# Patient Record
Sex: FEMALE | Race: WHITE | NOT HISPANIC OR LATINO
[De-identification: names, ages, dates, MRNs, and addresses within clinical notes are randomized per-mention and may not be internally consistent; named-entity substitution may affect disease eponyms.]

---

## 2017-10-29 ENCOUNTER — TRANSCRIPTION ENCOUNTER (OUTPATIENT)
Age: 57
End: 2017-10-29

## 2017-11-08 ENCOUNTER — TRANSCRIPTION ENCOUNTER (OUTPATIENT)
Age: 57
End: 2017-11-08

## 2018-02-06 ENCOUNTER — APPOINTMENT (OUTPATIENT)
Dept: INFECTIOUS DISEASE | Facility: CLINIC | Age: 58
End: 2018-02-06
Payer: SELF-PAY

## 2018-02-06 DIAGNOSIS — Z71.89 OTHER SPECIFIED COUNSELING: ICD-10-CM

## 2018-02-06 PROCEDURE — 90690 TYPHOID VACCINE ORAL: CPT

## 2018-02-06 PROCEDURE — 90632 HEPA VACCINE ADULT IM: CPT

## 2018-02-06 PROCEDURE — 90473 IMMUNE ADMIN ORAL/NASAL: CPT | Mod: NC

## 2018-02-06 PROCEDURE — 90472 IMMUNIZATION ADMIN EACH ADD: CPT | Mod: NC

## 2018-02-06 PROCEDURE — 99401 PREV MED CNSL INDIV APPRX 15: CPT | Mod: 25

## 2018-02-06 RX ORDER — AZITHROMYCIN 250 MG/1
250 TABLET, FILM COATED ORAL
Qty: 4 | Refills: 0 | Status: ACTIVE | COMMUNITY
Start: 2018-02-06 | End: 1900-01-01

## 2018-05-13 ENCOUNTER — TRANSCRIPTION ENCOUNTER (OUTPATIENT)
Age: 58
End: 2018-05-13

## 2019-09-23 ENCOUNTER — APPOINTMENT (OUTPATIENT)
Dept: PULMONOLOGY | Facility: CLINIC | Age: 59
End: 2019-09-23
Payer: COMMERCIAL

## 2019-09-23 VITALS
SYSTOLIC BLOOD PRESSURE: 103 MMHG | RESPIRATION RATE: 16 BRPM | TEMPERATURE: 98.2 F | OXYGEN SATURATION: 99 % | DIASTOLIC BLOOD PRESSURE: 68 MMHG | WEIGHT: 149.5 LBS | HEART RATE: 71 BPM | BODY MASS INDEX: 24.03 KG/M2 | HEIGHT: 66 IN

## 2019-09-23 DIAGNOSIS — F51.04 PSYCHOPHYSIOLOGIC INSOMNIA: ICD-10-CM

## 2019-09-23 PROCEDURE — 99204 OFFICE O/P NEW MOD 45 MIN: CPT

## 2019-09-23 RX ORDER — ZOLPIDEM TARTRATE 5 MG/1
5 TABLET ORAL
Qty: 15 | Refills: 0 | Status: ACTIVE | COMMUNITY
Start: 2019-09-23 | End: 1900-01-01

## 2019-09-23 NOTE — REVIEW OF SYSTEMS
[Snoring] : snoring [Arthralgias] : arthralgias [Difficulty Initiating Sleep] : difficulty falling asleep [Difficulty Maintaining Sleep] : difficulty maintaining sleep [Negative] : Psychiatric [EDS: ESS=____] : no daytime somnolence [Fatigue] : no fatigue [Nasal Congestion] : no nasal congestion [Witnessed Apneas] : no witnessed apnea [Chest Pain] : no chest pain [Obesity] : not obese [Anemia] : no anemia [A.M. Headache] : no headache present upon awakening [Lower Extremity Discomfort] : no lower extremity discomfort [Unusual Sleep Behavior] : no unusual sleep behavior [Cataplexy] :  no cataplexy

## 2019-09-23 NOTE — PHYSICAL EXAM
[General Appearance - In No Acute Distress] : no acute distress [Normal Conjunctiva] : the conjunctiva exhibited no abnormalities [III] : III [Neck Appearance] : the appearance of the neck was normal [] : the neck was supple [Heart Rate And Rhythm] : heart rate was normal and rhythm regular [Heart Sounds] : normal S1 and S2 [Respiration, Rhythm And Depth] : normal respiratory rhythm and effort [Auscultation Breath Sounds / Voice Sounds] : lungs were clear to auscultation bilaterally [Involuntary Movements] : no involuntary movements were seen [Nail Clubbing] : no clubbing of the fingernails [Non-Pitting] : non-pitting [Skin Color & Pigmentation] : normal skin color and pigmentation [No Focal Deficits] : no focal deficits [Oriented To Time, Place, And Person] : oriented to person, place, and time [Affect] : the affect was normal

## 2019-09-23 NOTE — HISTORY OF PRESENT ILLNESS
[FreeTextEntry1] : 58yo F with history of hip replacement in 3/19. Since then, she has had difficulty falling asleep in the beginning. She gets into bed around 10:30pm, reads a bit, she takes 5mg ambien (since the surgery) and she falls asleep within half and hour and she sleeps until the morning, around 7am. She feels rested in the am, sometimes groggy and feels pretty well during the day. Prior to her precipitating factor of the hip surgery, she experienced insomnia intermittently for which she would sometimes take ambien. \par \par She snores at night but denies gasping, choking, witnessed apneas. She denies any frequent nocturnal awakenings and denies excessive daytime somnolence as well. Also denies any nasal congestion.

## 2019-09-23 NOTE — CONSULT LETTER
[Dear  ___] : Dear  [unfilled], [Consult Letter:] : I had the pleasure of evaluating your patient, [unfilled]. [Please see my note below.] : Please see my note below. [Consult Closing:] : Thank you very much for allowing me to participate in the care of this patient.  If you have any questions, please do not hesitate to contact me. [Sincerely,] : Sincerely, [FreeTextEntry3] : Yessica Bates MD

## 2019-09-23 NOTE — ASSESSMENT
[FreeTextEntry1] : 58yo F with history of hip replacement in 3/19. Since then, she has had difficulty falling asleep in the beginning. She gets into bed around 10:30pm, reads a bit, she takes 5mg ambien (since the surgery) and she falls asleep within half and hour and she sleeps until the morning, around 7am. She feels rested in the am, sometimes groggy and feels pretty well during the day. Prior to her precipitating factor of the hip surgery, she experienced insomnia intermittently for which she would sometimes take ambien. \par \par She snores at night but denies gasping, choking, witnessed apneas. She denies any frequent nocturnal awakenings and denies excessive daytime somnolence as well. Also denies any nasal congestion. \par \par Snoring -- isolated snoring without other associated signs or symptoms of apnea. This is still a possibility given that she is postmenopausal and has a crowded airway, but will address this further after dealing with chronic insomnia first. \par Chronic insomnia -- with a clear precipitating factor (hip replacement surgery). She is now on ambien 5mg daily and afraid to discontinue this. I explained that intermittent use of ambien would be alright, and decrease the risk of developing tolerance. I suggested that we decrease the dose to 2.5mg daily for now, maintain good sleep hygiene and stimulus control and she will follow up with me in 1 month. I also recommended that she start a simple 3-5 minute breathing exercise (observing/counting her breaths) and use this every day right before she falls alseep in order to link this to her sleep onset (conditioning). That way, in the future when we try and discontinue the ambien, she will still have this as a tool/ritual to help her fall asleep. \par Also explained sleep restriction protocol as a means of shortening sleep onset if she has difficulty with lower ambien dose

## 2019-10-24 ENCOUNTER — RESULT REVIEW (OUTPATIENT)
Age: 59
End: 2019-10-24

## 2019-10-29 ENCOUNTER — APPOINTMENT (OUTPATIENT)
Dept: PULMONOLOGY | Facility: CLINIC | Age: 59
End: 2019-10-29
Payer: COMMERCIAL

## 2019-10-29 VITALS
HEIGHT: 66 IN | BODY MASS INDEX: 24.11 KG/M2 | WEIGHT: 150 LBS | OXYGEN SATURATION: 96 % | SYSTOLIC BLOOD PRESSURE: 117 MMHG | DIASTOLIC BLOOD PRESSURE: 74 MMHG | TEMPERATURE: 98 F | RESPIRATION RATE: 16 BRPM | HEART RATE: 56 BPM

## 2019-10-29 DIAGNOSIS — R06.83 SNORING: ICD-10-CM

## 2019-10-29 PROCEDURE — 99215 OFFICE O/P EST HI 40 MIN: CPT

## 2019-11-21 ENCOUNTER — APPOINTMENT (OUTPATIENT)
Dept: PULMONOLOGY | Facility: CLINIC | Age: 59
End: 2019-11-21
Payer: SELF-PAY

## 2019-11-21 PROCEDURE — 90791 PSYCH DIAGNOSTIC EVALUATION: CPT

## 2019-12-19 ENCOUNTER — APPOINTMENT (OUTPATIENT)
Dept: PULMONOLOGY | Facility: CLINIC | Age: 59
End: 2019-12-19

## 2020-08-10 ENCOUNTER — TRANSCRIPTION ENCOUNTER (OUTPATIENT)
Age: 60
End: 2020-08-10

## 2020-09-02 ENCOUNTER — TRANSCRIPTION ENCOUNTER (OUTPATIENT)
Age: 60
End: 2020-09-02

## 2020-09-20 ENCOUNTER — TRANSCRIPTION ENCOUNTER (OUTPATIENT)
Age: 60
End: 2020-09-20

## 2020-11-17 ENCOUNTER — TRANSCRIPTION ENCOUNTER (OUTPATIENT)
Age: 60
End: 2020-11-17

## 2020-11-18 ENCOUNTER — EMERGENCY (EMERGENCY)
Facility: HOSPITAL | Age: 60
LOS: 1 days | Discharge: HOME | End: 2020-11-18
Attending: EMERGENCY MEDICINE | Admitting: EMERGENCY MEDICINE
Payer: COMMERCIAL

## 2020-11-18 VITALS
OXYGEN SATURATION: 97 % | RESPIRATION RATE: 15 BRPM | TEMPERATURE: 98 F | DIASTOLIC BLOOD PRESSURE: 90 MMHG | HEART RATE: 98 BPM | SYSTOLIC BLOOD PRESSURE: 141 MMHG

## 2020-11-18 DIAGNOSIS — J36 PERITONSILLAR ABSCESS: ICD-10-CM

## 2020-11-18 LAB
ALBUMIN SERPL ELPH-MCNC: 4.5 G/DL — SIGNIFICANT CHANGE UP (ref 3.3–5)
ALP SERPL-CCNC: 61 U/L — SIGNIFICANT CHANGE UP (ref 40–120)
ALT FLD-CCNC: 13 U/L — SIGNIFICANT CHANGE UP (ref 4–33)
ANION GAP SERPL CALC-SCNC: 14 MMO/L — SIGNIFICANT CHANGE UP (ref 7–14)
AST SERPL-CCNC: 16 U/L — SIGNIFICANT CHANGE UP (ref 4–32)
BASOPHILS # BLD AUTO: 0.06 K/UL — SIGNIFICANT CHANGE UP (ref 0–0.2)
BASOPHILS NFR BLD AUTO: 0.5 % — SIGNIFICANT CHANGE UP (ref 0–2)
BILIRUB SERPL-MCNC: 0.7 MG/DL — SIGNIFICANT CHANGE UP (ref 0.2–1.2)
BUN SERPL-MCNC: 18 MG/DL — SIGNIFICANT CHANGE UP (ref 7–23)
CALCIUM SERPL-MCNC: 9.6 MG/DL — SIGNIFICANT CHANGE UP (ref 8.4–10.5)
CHLORIDE SERPL-SCNC: 102 MMOL/L — SIGNIFICANT CHANGE UP (ref 98–107)
CO2 SERPL-SCNC: 22 MMOL/L — SIGNIFICANT CHANGE UP (ref 22–31)
CREAT SERPL-MCNC: 0.82 MG/DL — SIGNIFICANT CHANGE UP (ref 0.5–1.3)
EOSINOPHIL # BLD AUTO: 0.03 K/UL — SIGNIFICANT CHANGE UP (ref 0–0.5)
EOSINOPHIL NFR BLD AUTO: 0.2 % — SIGNIFICANT CHANGE UP (ref 0–6)
GLUCOSE SERPL-MCNC: 113 MG/DL — HIGH (ref 70–99)
HCT VFR BLD CALC: 44.9 % — SIGNIFICANT CHANGE UP (ref 34.5–45)
HGB BLD-MCNC: 15.2 G/DL — SIGNIFICANT CHANGE UP (ref 11.5–15.5)
IMM GRANULOCYTES NFR BLD AUTO: 0.5 % — SIGNIFICANT CHANGE UP (ref 0–1.5)
LYMPHOCYTES # BLD AUTO: 1.79 K/UL — SIGNIFICANT CHANGE UP (ref 1–3.3)
LYMPHOCYTES # BLD AUTO: 14 % — SIGNIFICANT CHANGE UP (ref 13–44)
MCHC RBC-ENTMCNC: 29.4 PG — SIGNIFICANT CHANGE UP (ref 27–34)
MCHC RBC-ENTMCNC: 33.9 % — SIGNIFICANT CHANGE UP (ref 32–36)
MCV RBC AUTO: 86.8 FL — SIGNIFICANT CHANGE UP (ref 80–100)
MONOCYTES # BLD AUTO: 1.34 K/UL — HIGH (ref 0–0.9)
MONOCYTES NFR BLD AUTO: 10.4 % — SIGNIFICANT CHANGE UP (ref 2–14)
NEUTROPHILS # BLD AUTO: 9.54 K/UL — HIGH (ref 1.8–7.4)
NEUTROPHILS NFR BLD AUTO: 74.4 % — SIGNIFICANT CHANGE UP (ref 43–77)
NRBC # FLD: 0 K/UL — SIGNIFICANT CHANGE UP (ref 0–0)
PLATELET # BLD AUTO: 261 K/UL — SIGNIFICANT CHANGE UP (ref 150–400)
PMV BLD: 10.2 FL — SIGNIFICANT CHANGE UP (ref 7–13)
POTASSIUM SERPL-MCNC: 3.8 MMOL/L — SIGNIFICANT CHANGE UP (ref 3.5–5.3)
POTASSIUM SERPL-SCNC: 3.8 MMOL/L — SIGNIFICANT CHANGE UP (ref 3.5–5.3)
PROT SERPL-MCNC: 7.2 G/DL — SIGNIFICANT CHANGE UP (ref 6–8.3)
RBC # BLD: 5.17 M/UL — SIGNIFICANT CHANGE UP (ref 3.8–5.2)
RBC # FLD: 13 % — SIGNIFICANT CHANGE UP (ref 10.3–14.5)
SARS-COV-2 RNA SPEC QL NAA+PROBE: SIGNIFICANT CHANGE UP
SODIUM SERPL-SCNC: 138 MMOL/L — SIGNIFICANT CHANGE UP (ref 135–145)
WBC # BLD: 12.83 K/UL — HIGH (ref 3.8–10.5)
WBC # FLD AUTO: 12.83 K/UL — HIGH (ref 3.8–10.5)

## 2020-11-18 PROCEDURE — 99245 OFF/OP CONSLTJ NEW/EST HI 55: CPT | Mod: 25

## 2020-11-18 PROCEDURE — 99218: CPT

## 2020-11-18 PROCEDURE — 70491 CT SOFT TISSUE NECK W/DYE: CPT | Mod: 26

## 2020-11-18 PROCEDURE — 31505 DIAGNOSTIC LARYNGOSCOPY: CPT

## 2020-11-18 RX ORDER — SODIUM CHLORIDE 9 MG/ML
1000 INJECTION INTRAMUSCULAR; INTRAVENOUS; SUBCUTANEOUS ONCE
Refills: 0 | Status: COMPLETED | OUTPATIENT
Start: 2020-11-18 | End: 2020-11-18

## 2020-11-18 RX ORDER — METOPROLOL TARTRATE 50 MG
12.5 TABLET ORAL ONCE
Refills: 0 | Status: COMPLETED | OUTPATIENT
Start: 2020-11-18 | End: 2020-11-18

## 2020-11-18 RX ORDER — KETOROLAC TROMETHAMINE 30 MG/ML
30 SYRINGE (ML) INJECTION ONCE
Refills: 0 | Status: DISCONTINUED | OUTPATIENT
Start: 2020-11-18 | End: 2020-11-18

## 2020-11-18 RX ORDER — SODIUM CHLORIDE 9 MG/ML
1000 INJECTION INTRAMUSCULAR; INTRAVENOUS; SUBCUTANEOUS
Refills: 0 | Status: DISCONTINUED | OUTPATIENT
Start: 2020-11-18 | End: 2020-11-22

## 2020-11-18 RX ORDER — DEXAMETHASONE 0.5 MG/5ML
6 ELIXIR ORAL EVERY 12 HOURS
Refills: 0 | Status: DISCONTINUED | OUTPATIENT
Start: 2020-11-19 | End: 2020-11-22

## 2020-11-18 RX ORDER — ATORVASTATIN CALCIUM 80 MG/1
80 TABLET, FILM COATED ORAL AT BEDTIME
Refills: 0 | Status: DISCONTINUED | OUTPATIENT
Start: 2020-11-18 | End: 2020-11-22

## 2020-11-18 RX ORDER — IBUPROFEN 200 MG
600 TABLET ORAL EVERY 6 HOURS
Refills: 0 | Status: DISCONTINUED | OUTPATIENT
Start: 2020-11-18 | End: 2020-11-22

## 2020-11-18 RX ORDER — METOPROLOL TARTRATE 50 MG
12.5 TABLET ORAL ONCE
Refills: 0 | Status: DISCONTINUED | OUTPATIENT
Start: 2020-11-18 | End: 2020-11-18

## 2020-11-18 RX ORDER — METOPROLOL TARTRATE 50 MG
25 TABLET ORAL ONCE
Refills: 0 | Status: COMPLETED | OUTPATIENT
Start: 2020-11-18 | End: 2020-11-18

## 2020-11-18 RX ORDER — ASPIRIN/CALCIUM CARB/MAGNESIUM 324 MG
81 TABLET ORAL DAILY
Refills: 0 | Status: DISCONTINUED | OUTPATIENT
Start: 2020-11-18 | End: 2020-11-22

## 2020-11-18 RX ORDER — ZOLPIDEM TARTRATE 10 MG/1
5 TABLET ORAL ONCE
Refills: 0 | Status: DISCONTINUED | OUTPATIENT
Start: 2020-11-18 | End: 2020-11-18

## 2020-11-18 RX ORDER — DEXAMETHASONE 0.5 MG/5ML
6 ELIXIR ORAL ONCE
Refills: 0 | Status: COMPLETED | OUTPATIENT
Start: 2020-11-18 | End: 2020-11-18

## 2020-11-18 RX ORDER — ACETAMINOPHEN 500 MG
975 TABLET ORAL EVERY 6 HOURS
Refills: 0 | Status: DISCONTINUED | OUTPATIENT
Start: 2020-11-18 | End: 2020-11-22

## 2020-11-18 RX ADMIN — Medication 100 MILLIGRAM(S): at 13:44

## 2020-11-18 RX ADMIN — Medication 30 MILLIGRAM(S): at 13:44

## 2020-11-18 RX ADMIN — ZOLPIDEM TARTRATE 5 MILLIGRAM(S): 10 TABLET ORAL at 23:17

## 2020-11-18 RX ADMIN — Medication 100 MILLIGRAM(S): at 21:38

## 2020-11-18 RX ADMIN — SODIUM CHLORIDE 125 MILLILITER(S): 9 INJECTION INTRAMUSCULAR; INTRAVENOUS; SUBCUTANEOUS at 21:38

## 2020-11-18 RX ADMIN — ATORVASTATIN CALCIUM 80 MILLIGRAM(S): 80 TABLET, FILM COATED ORAL at 21:41

## 2020-11-18 RX ADMIN — Medication 81 MILLIGRAM(S): at 18:40

## 2020-11-18 RX ADMIN — Medication 12.5 MILLIGRAM(S): at 22:09

## 2020-11-18 RX ADMIN — SODIUM CHLORIDE 1000 MILLILITER(S): 9 INJECTION INTRAMUSCULAR; INTRAVENOUS; SUBCUTANEOUS at 14:08

## 2020-11-18 RX ADMIN — Medication 6 MILLIGRAM(S): at 13:44

## 2020-11-18 RX ADMIN — SODIUM CHLORIDE 125 MILLILITER(S): 9 INJECTION INTRAMUSCULAR; INTRAVENOUS; SUBCUTANEOUS at 18:40

## 2020-11-18 NOTE — ED CDU PROVIDER INITIAL DAY NOTE - MEDICAL DECISION MAKING DETAILS
this is a 60 y.o female with a PMhx of HTN, MV repair presented to the ED complaining of having a sore throat along with fever over the past 2 days,   iv clindamycin  iv decadron  reassess with ENT in morning

## 2020-11-18 NOTE — ED PROVIDER NOTE - NS ED ROS FT
Constitutional: +fevers, no chills.  Eyes: no visual changes.  Ears: no ear drainage, no ear pain.  Nose: no nasal congestion.  Mouth/Throat: +sore throat.  Cardiovascular: no chest pain.  Respiratory: no shortness of breath, no wheezing, no cough  Gastrointestinal: no nausea, no vomiting, no diarrhea, no abdominal pain.  MSK: no flank pain, no back pain.  Genitourinary: no dysuria, no hematuria.  Skin: no rashes.  Neuro: no headache

## 2020-11-18 NOTE — ED ADULT TRIAGE NOTE - CHIEF COMPLAINT QUOTE
Pt presents to ED for sore throat, fevers, chills, and pain when swallowing x3days. Pt states she was sent by NP Wil from ENT for a possible parapharyngeal abscess. Denies SOB and difficulty beathing. Able to speak in full complete sentences. Pmhx of mitral valve repair.

## 2020-11-18 NOTE — ED PROVIDER NOTE - PHYSICAL EXAMINATION
Physical Examination: Gen: NAD, non-toxic, conversational  Eyes: PERRLA, EOMI   HENT: Normocephalic, atraumatic. mildly erythematous pharynx, no tonsilar or uvula, edema, L sided anterior lna,  no rhinorrhea, moist mucous membranes.   CV: RRR, no M/R/G  Resp: CTAB, non-labored, speaking without difficulty on room air  Abd: soft, non-tender, non rigid, no guarding or rebound tenderness  Back: No CVAT bilaterally, no midline ttp  Skin: dry, wwp   Neuro: AOx3, speech is fluent and appropriate, HOLT without laterality, stable gait   Psych: Mood okay, affect euthymic Physical Examination: Gen: NAD, non-toxic, conversational  Eyes: PERRLA, EOMI   HENT: Normocephalic, atraumatic. mildly erythematous pharynx, no tonsilar or uvula, edema, L sided anterior cervical mass palpated non fluctuant, no rhinorrhea, moist mucous membranes.   CV: RRR, no M/R/G  Resp: CTAB, non-labored, speaking without difficulty on room air  Abd: soft, non-tender, non rigid, no guarding or rebound tenderness  Back: No CVAT bilaterally, no midline ttp  Skin: dry, wwp   Neuro: AOx3, speech is fluent and appropriate, HOLT without laterality, stable gait   Psych: Mood okay, affect euthymic Physical Examination: Gen: NAD, non-toxic, conversational  Eyes: PERRLA, EOMI   HENT: Normocephalic, atraumatic. mildly erythematous pharynx, no tonsilar or uvula, edema, L sided anterior cervical mass palpated non fluctuant, no rhinorrhea, moist mucous membranes.   CV: RRR, no M/R/G  Resp: CTAB, non-labored, speaking without difficulty on room air  Abd: soft, non-tender, non rigid, no guarding or rebound tenderness  Back: No CVAT bilaterally, no midline ttp  Skin: dry, wwp   Neuro: AOx3, speech is fluent and appropriate, HOLT without laterality, stable gait   Psych: Mood okay, affect euthymic  ATTENDING PHYSICAL EXAM  GEN - NAD; well appearing; A+O x3, speaking with slightly hoarse voice  HEAD - NC/AT; EYES/NOSE - PERRL, EOMI, mucous membranes moist, no discharge; THROAT: no trismus.  Noted normal elevation of palate with slight deviation of uvula to left side with palate elevation.  NECK: Neck supple, non-tender without lymphadenopathy, no masses, no JVD  PULMONARY - CTA b/l, symmetric breath sounds  CARDIAC -s1s2, RRR, no M,R,G  ABDOMEN - +BS, ND, NT, soft, no guarding, no rebound, no masses   BACK - no CVA tenderness, No vertebral or paravertebral tenderness  EXTREMITIES - symmetric pulses, 2+ dp, capillary refill < 2 seconds, no clubbing, no cyanosis, no edema  SKIN - no rash or bruising   NEUROLOGIC - alert, CN 2-12 intact Physical Examination: Gen: NAD, non-toxic, conversational  Eyes: PERRLA, EOMI   HENT: Normocephalic, atraumatic. mildly erythematous pharynx, L tonsilar erythema, uvula wnl , edema to L tonsil, L sided anterior cervical LNA, no rhinorrhea, moist mucous membranes.   CV: RRR, no M/R/G  Resp: CTAB, non-labored, speaking without difficulty on room air  Abd: soft, non-tender, non rigid, no guarding or rebound tenderness  Back: No CVAT bilaterally, no midline ttp  Skin: dry, wwp   Neuro: AOx3, speech is fluent and appropriate, HOLT without laterality, stable gait   Psych: Mood okay, affect euthymic  ATTENDING PHYSICAL EXAM  GEN - NAD; well appearing; A+O x3, speaking with slightly hoarse voice  HEAD - NC/AT; EYES/NOSE - PERRL, EOMI, mucous membranes moist, no discharge; THROAT: no trismus.  Noted normal elevation of palate with slight deviation of uvula to left side with palate elevation.  NECK: Neck supple, non-tender without lymphadenopathy, no masses, no JVD  PULMONARY - CTA b/l, symmetric breath sounds  CARDIAC -s1s2, RRR, no M,R,G  ABDOMEN - +BS, ND, NT, soft, no guarding, no rebound, no masses   BACK - no CVA tenderness, No vertebral or paravertebral tenderness  EXTREMITIES - symmetric pulses, 2+ dp, capillary refill < 2 seconds, no clubbing, no cyanosis, no edema  SKIN - no rash or bruising   NEUROLOGIC - alert, CN 2-12 intact

## 2020-11-18 NOTE — ED PROVIDER NOTE - CARE PLAN
Principal Discharge DX:	Parapharyngeal abscess   Principal Discharge DX:	Tonsillar abscess  Secondary Diagnosis:	Tonsillitis  Secondary Diagnosis:	Supraglottitis

## 2020-11-18 NOTE — ED ADULT NURSE NOTE - CHIEF COMPLAINT QUOTE
Pt presents to ED for sore throat, fevers, chills, and pain when swallowing x3days. Pt states she was sent by NP Wil from ENT for a possible parapharyngeal abscess. Denies SOB and difficulty beathing. Able to speak in full complete sentences. Pmhx of mitral valve repair.
149

## 2020-11-18 NOTE — ED PROVIDER NOTE - CLINICAL SUMMARY MEDICAL DECISION MAKING FREE TEXT BOX
60F hx htn, MV repair, presents with sore throat, dysphagia, hoarseness and fever x 2 days, tmax 100F, found to have abscess around the vocal chords from ENT office today. ENt paged, CT soft tissue neck, basic labs, treat pain with anti inflammatories and abx

## 2020-11-18 NOTE — CONSULT NOTE ADULT - PROBLEM SELECTOR RECOMMENDATION 9
- No surgical intervention at this time  - Continue decadron 6mg Q12 for another 2 dose  - Continue IV Clindamycin 600mg Q8 hours  - Observe in CDU tonight  - Regular diet  - Pain control per CDU  - Please page [phone] for any questions  - CT neck image reviewed with Dr. Winters  - Case discussed with Dr. Winters

## 2020-11-18 NOTE — ED CDU PROVIDER INITIAL DAY NOTE - OBJECTIVE STATEMENT
this is a 60 y.o female with a PMhx of HTN, MV repair presented to the ED complaining of having a sore throat along with fever over the past 2 days, patient had a fever of approx 100F, patient went to the ENT today, whom found a parapharyngeal abscess, this is a 60 y.o female with a PMhx of HTN, MV repair CAD S/p stent 6 years prior presented to the ED complaining of having a sore throat along with fever over the past 2 days, patient had a fever of approx 100F, patient went to the ENT today, whom found a parapharyngeal abscess, and was told to go to the ER, patient was evaluated by ENT and CT of her neck and was recommend to get IV antibiotics and iv decadron in CDU. Pt currently states that she hasn't been able to eat much , denies having any nausea, vomiting, diarrhea, abdominal pain, CP, SOB, [place], headaches, dizziness. Pt does not have any fever at this time.

## 2020-11-18 NOTE — CONSULT NOTE ADULT - ASSESSMENT
61 y/o F with sore throat for 2 days, sent in by outside ENT to r/o parapharyngeal abscess. FOE shows +pooling of secretions, bilateral erythematous arytenoids with left edematous arytenoid. Afebrile. CT neck shows left-sided tonsillitis with an associated lower tonsillar abscess near the pharyngoepiglottic fold with supraglottic inflammation/supraglottitis.

## 2020-11-18 NOTE — CONSULT NOTE ADULT - SUBJECTIVE AND OBJECTIVE BOX
CC: Sore throat for 2 days    HPI: 59 y/o F with a history of HTN and mitral valve repair who sent in by outside ENT to r/o parapharyngeal abscess. Patient reports sore throat for 2 days and developed fever at home, went to urgent care and sent to outpatient ENT where they scoped her and sent her to ED to r/o parapharyngeal abscess. Patient reports mild odynophagia, denies dysphagia, fever, chills, SOB, trismus, limited ROM at neck, and changes in voice.      PAST MEDICAL & SURGICAL HISTORY:  HTN    Past surgical history  Mitral valve repair      Allergies    No Known Allergies    Intolerances      MEDICATIONS  (STANDING):    MEDICATIONS  (PRN):      Social History: nonsmoker    Family history: No pertinent family history in first degree relatives    ROS:   ENT: all negative except as noted in HPI   CV: denies palpitations  Pulm: denies SOB, cough, hemoptysis  GI: denies change in appetite, indigestion, n/v  : denies pertinent urinary symptoms, urgency  Neuro: denies numbness/tingling, loss of sensation  Psych: denies anxiety  MS: denies muscle weakness, instability  Heme: denies easy bruising or bleeding  Endo: denies heat/cold intolerance, excessive sweating  Vascular: denies LE edema    Vital Signs Last 24 Hrs  T(C): 36.6 (18 Nov 2020 12:49), Max: 36.6 (18 Nov 2020 12:49)  T(F): 97.9 (18 Nov 2020 12:49), Max: 97.9 (18 Nov 2020 12:49)  HR: 98 (18 Nov 2020 12:49) (98 - 98)  BP: 141/90 (18 Nov 2020 12:49) (141/90 - 141/90)  BP(mean): --  RR: 15 (18 Nov 2020 12:49) (15 - 15)  SpO2: 97% (18 Nov 2020 12:49) (97% - 97%)                          15.2   12.83 )-----------( 261      ( 18 Nov 2020 13:37 )             44.9    11-18    138  |  102  |  18  ----------------------------<  113<H>  3.8   |  22  |  0.82    Ca    9.6      18 Nov 2020 13:37    TPro  7.2  /  Alb  4.5  /  TBili  0.7  /  DBili  x   /  AST  16  /  ALT  13  /  AlkPhos  61  11-18       PHYSICAL EXAM:  Gen: NAD  Skin: No rashes, bruises, or lesions  Head: Normocephalic, Atraumatic  Face: no edema, erythema, or fluctuance. Parotid glands soft without mass  Eyes: no scleral injection  Nose: Nares bilaterally patent, no discharge  Mouth: No stridor, no drooling, no trismus.  Mucosa moist, tongue/uvula midline, oropharynx clear  Neck: Flat, supple, no lymphadenopathy, trachea midline, no masses  Lymphatic: No lymphadenopathy  Resp: breathing easily, no stridor  CV: no peripheral edema/cyanosis  GI: nondistended   Peripheral vascular: no JVD or edema  Neuro: facial nerve intact, no facial droop    Fiberoptic Indirect laryngoscopy:  (Scope #2 used)  Reason for Laryngoscopy:    Patient was unable to cooperate with mirror.  Nasopharynx, oropharynx, and hypopharynx clear, no bleeding. Tongue base, posterior pharyngeal wall, vallecula, epiglottis, and subglottis appear normal. +pooling of secretions. No erythema, edema, masses or lesions. Airway patent, no foreign body visualized. No glottic/supraglottic edema. Bilateral erythematous arytenoids with left edematous arytenoid. True vocal cords, vestibular folds, ventricles, pyriform sinuses, and aryepiglottic folds appear normal bilaterally. Vocal cords mobile with good contact b/l.      IMAGING/ADDITIONAL STUDIES:   < from: CT Neck Soft Tissue w/ IV Cont (11.18.20 @ 16:44) >  EXAM:  CT NECK SOFT TISSUE IC        PROCEDURE DATE:  Nov 18 2020         INTERPRETATION:  INDICATIONS:  Sore throat. Suspected abscess.    TECHNIQUE:   Axial images were obtained following the intravenous administration of contrast material. Sagittal and coronal reformatted images were obtained. 90 cc Omnipaque 350 were administered. 10 cc were discarded.    COMPARISON EXAMINATION:  None.    FINDINGS:  AERODIGESTIVE TRACT:  There is a peripherally enhancing low-density collection within the inferior aspect of the left tonsillar bed at the junction with the pharyngoepiglottic fold just above the level of the epiglottis/hyoid measuring 1.7 x 0.9 x 1.3 cm. There is mild haziness of adjacent fat planes as well as soft tissue thickening of the epiglottis and left AE fold compatible with associated inflammatory changes or supraglottitis. There is enlargement and increased enhancement of the left palatine tonsil compatible with associated tonsillitis. There is no significant airway compromise.  LYMPH NODES:  Prominent sized, rounded and abnormally enhancing left level II lymph nodes are seen measuring up to 1.4 x 0.9 cm. No other adenopathy is seen.  PAROTID GLANDS:  Normal.  SUBMANDIBULAR GLANDS:  Normal.  THYROID GLAND:  Heterogeneous with scattered small nodules  VISUALIZED PARANASAL SINUSES:  Small left maxillary polyps or retention cysts.  VISUALIZED TYMPANOMASTOID CAVITIES: Clear.  BONES:  Status post median sternotomy  MISCELLANEOUS:  None.    IMPRESSION:  Left-sided tonsillitis with an associated lower tonsillar abscess near the pharyngoepiglottic fold with supraglottic inflammation/supraglottitis.    Prominent sized left level II lymph nodes.    Findings were discussed with Dr. Uribe on 11/18/2020 5:18 PM with read back.                JUAN WARREN MD; Attending Radiologist  This document has been electronically signed. Nov 18 2020  5:19PM    < end of copied text >

## 2020-11-18 NOTE — ED ADULT NURSE NOTE - OBJECTIVE STATEMENT
Pt aa&ox4 hx htn, MV repair, presents with sore throat, dysphagia, hoarseness and fever x 2 days, tmax 100F, found to have "parapharyngeal abscess" from ENT office today. here to see ENT for possible drainage. dysphagia but no SOB. neg strep throat, neg covid from yesterday. Pt breathing unlabored, speaking in full sentences. 20g IV placed in R AC, labs sent. Will monitor.

## 2020-11-18 NOTE — ED PROVIDER NOTE - OBJECTIVE STATEMENT
60F hx htn, MV repair, presents with sore throat, dysphagia, hoarseness and fever x 2 days, tmax 100F, found to have "parapharyngeal abscess" from ENT office today. here to see ENT for drainage. dysphagia but no SOB. neg strep throat, neg covid from yesterday. 60F hx htn, MV repair, presents with sore throat, dysphagia, hoarseness and fever x 2 days, tmax 100F, found to have "parapharyngeal abscess" from ENT office today. here to see ENT for drainage. dysphagia but no SOB. neg strep throat, neg covid from yesterday.  Attending - Agree with above.  I evaluated patient myself. 61 y/o F c/o sore throat and dysphagia x 2 days.  To ENT MD today, seen by NP, and reports had laryngoscopy that showed keaton-laryngeal abscess, and referred to ED for eval and further management.  Denies chest pain, shortness of breath.  + fever yesterday.  No cough.  No covid dx.

## 2020-11-19 VITALS
SYSTOLIC BLOOD PRESSURE: 121 MMHG | OXYGEN SATURATION: 96 % | TEMPERATURE: 98 F | RESPIRATION RATE: 18 BRPM | HEART RATE: 71 BPM | DIASTOLIC BLOOD PRESSURE: 61 MMHG

## 2020-11-19 DIAGNOSIS — J36 PERITONSILLAR ABSCESS: ICD-10-CM

## 2020-11-19 PROCEDURE — 99217: CPT

## 2020-11-19 PROCEDURE — 99243 OFF/OP CNSLTJ NEW/EST LOW 30: CPT

## 2020-11-19 RX ADMIN — Medication 100 MILLIGRAM(S): at 05:46

## 2020-11-19 RX ADMIN — Medication 6 MILLIGRAM(S): at 01:01

## 2020-11-19 RX ADMIN — SODIUM CHLORIDE 125 MILLILITER(S): 9 INJECTION INTRAMUSCULAR; INTRAVENOUS; SUBCUTANEOUS at 06:06

## 2020-11-19 NOTE — ED CDU PROVIDER SUBSEQUENT DAY NOTE - HISTORY
59 y/o F with sore throat for 2 days, dx w/ parapharyngeal abscess. accepted to CDU for IV abx, and steroids. patient reseting comfortably w/ no acute complaints. patient pending ENT reassessment. 61 y/o F with sore throat for 2 days, dx w/tonsillar abscess. accepted to CDU for IV abx, and steroids. patient reseting comfortably w/ no acute complaints. patient pending ENT reassessment.

## 2020-11-19 NOTE — ED CDU PROVIDER SUBSEQUENT DAY NOTE - PROGRESS NOTE DETAILS
Pt notes feeling significantly better.  Pt tolerating PO.  Pt evaluated by ENT PA at this time who recommends discharge on 7 days of Clindamycin with outpatient follow up.  Pt medically stable for discharge.  Strict return precautions given.  Pt to follow up with PMD and ENT.  Reassessment performed and plan for discharge discussed with Dr. Baptiste who agrees with disposition and discharge plan. CDU Att PN: Emile  Pt feeling well.  Seen by ENT again who cleared for DC.  Pt states asides from hoarseness, is much better now. Wants to go home.  I have personally performed a face to face evaluation of this patient including review of the history and focused physical exam.  I have discussed the case and reviewed the plan of care with the PA.

## 2020-11-19 NOTE — PROGRESS NOTE ADULT - SUBJECTIVE AND OBJECTIVE BOX
ENT ISSUE/POD: Left inferior peritonsillar abscess    HPI: Patient seen and examed at bedside, reports the pain and sore throat has improved significantly. No acute event overnight. Denies fever, chills, SOB, and abdominal pain.       PAST MEDICAL & SURGICAL HISTORY:  No pertinent past medical history    No significant past surgical history      Allergies    No Known Allergies    Intolerances      MEDICATIONS  (STANDING):  aspirin  chewable 81 milliGRAM(s) Oral daily  atorvastatin 80 milliGRAM(s) Oral at bedtime  clindamycin IVPB 600 milliGRAM(s) IV Intermittent every 8 hours  dexAMETHasone  Injectable 6 milliGRAM(s) IV Push every 12 hours  sodium chloride 0.9%. 1000 milliLiter(s) (125 mL/Hr) IV Continuous <Continuous>    MEDICATIONS  (PRN):  acetaminophen   Tablet .. 975 milliGRAM(s) Oral every 6 hours PRN Mild Pain (1 - 3)  ibuprofen  Tablet. 600 milliGRAM(s) Oral every 6 hours PRN Moderate Pain (4 - 6)      Social History: see consult note    Family history: see consult note    ROS:   ENT: all negative except as noted in HPI   Pulm: denies SOB, cough, hemoptysis  Neuro: denies numbness/tingling, loss of sensation  Endo: denies heat/cold intolerance, excessive sweating      Vital Signs Last 24 Hrs  T(C): 36.4 (19 Nov 2020 09:23), Max: 36.7 (18 Nov 2020 21:30)  T(F): 97.6 (19 Nov 2020 09:23), Max: 98 (18 Nov 2020 21:30)  HR: 71 (19 Nov 2020 09:23) (71 - 98)  BP: 121/61 (19 Nov 2020 09:23) (109/68 - 141/90)  BP(mean): --  RR: 18 (19 Nov 2020 09:23) (15 - 20)  SpO2: 96% (19 Nov 2020 09:23) (96% - 100%)                          15.2   12.83 )-----------( 261      ( 18 Nov 2020 13:37 )             44.9    11-18    138  |  102  |  18  ----------------------------<  113<H>  3.8   |  22  |  0.82    Ca    9.6      18 Nov 2020 13:37    TPro  7.2  /  Alb  4.5  /  TBili  0.7  /  DBili  x   /  AST  16  /  ALT  13  /  AlkPhos  61  11-18       PHYSICAL EXAM:  Gen: NAD  Skin: No rashes, bruises, or lesions  Head: Normocephalic, Atraumatic  Face: no edema, erythema, or fluctuance. Parotid glands soft without mass  Eyes: no scleral injection  Nose: Nares bilaterally patent, no discharge  Mouth: No Stridor / Drooling / Trismus.  Mucosa moist, tongue/uvula midline, oropharynx clear  Neck: Flat, supple, no lymphadenopathy, trachea midline, no masses  Lymphatic: No lymphadenopathy  Resp: breathing easily, no stridor  Neuro: facial nerve intact, no facial droop

## 2020-11-19 NOTE — ED CDU PROVIDER DISPOSITION NOTE - CLINICAL COURSE
CDU Att DC Note: Emile  Pt presented to ED c throat pain, found ot have tonsillar abscess. Rcv'd Abx, seen by ENT x 2, and feeling much better now.  Cleared this AM by ENT.  Is eating and drinking comfortably, pain controlled, still somewhat hoarse. Interested in DC.  Sara f/u greg ENt  I have personally performed a face to face evaluation of this patient including review of the history and focused physical exam.  I have discussed the case and reviewed the plan of care with the PA.

## 2020-11-19 NOTE — ED CDU PROVIDER DISPOSITION NOTE - PATIENT PORTAL LINK FT
You can access the FollowMyHealth Patient Portal offered by [facility] by registering at the following website: [email or web address]. By joining [employer]’s FollowMyHealth portal, you will also be able to view your health information using other applications (apps) compatible with our system.

## 2020-11-19 NOTE — ED CDU PROVIDER DISPOSITION NOTE - NSFOLLOWUPINSTRUCTIONS_ED_ALL_ED_FT
Advance activity as tolerated.  Continue all previously prescribed medications as directed unless otherwise instructed.  Take Clindamycin 300 mg two pills every 8 hours for 7 days.  Take Tylenol 650mg (Two 325 mg pills) every 4-6 hours as needed for pain or fevers.  Take Motrin (also sold as Advil or Ibuprofen) 400-600 mg (two or three 200 mg over the counter pills) every 8 hours as needed for moderate pain or fevers-- take with food.  Follow up with your primary care physician and (referral list provided or you may call the clinic to make an appointment [phone] )  in 48-72 hours- bring copies of your results.  Return to the ER for worsening or persistent symptoms, including but not limited to worsening/persistent pain, shortness of breath, fevers, difficulty swallowing, difficulty breathing, and/or ANY NEW OR CONCERNING SYMPTOMS. If you have issues obtaining follow up, please call: [phone] to obtain a doctor or specialist who takes your insurance in your area.  You may call [phone] to make an appointment with the internal medicine clinic.

## 2020-11-19 NOTE — ED CDU PROVIDER SUBSEQUENT DAY NOTE - MEDICAL DECISION MAKING DETAILS
59 y/o F with sore throat for 2 days, dx w/ parapharyngeal abscess. accepted to CDU for IV abx, and steroids. patient reseting comfortably w/ no acute complaints. patient pending ENT reassessment. 59 y/o F with sore throat for 2 days, dx w/ tonsillar abscess. accepted to CDU for IV abx, and steroids. patient resting comfortably w/ no acute complaints. patient pending ENT reassessment.

## 2020-11-19 NOTE — PROGRESS NOTE ADULT - ASSESSMENT
61 y/o F presents with sore throat and found to have a left inferior peritonsillar abscess, treated with decadron 10mg Q8 x 24 hours, and IV clindamycin. Tolerating soft diet, pain is controlled. Afebrile, no WBC.

## 2020-11-19 NOTE — PROGRESS NOTE ADULT - PROBLEM SELECTOR PLAN 1
- Patient improved significantly with IV decadron and clindamycin  - Okay to discharge home with PO clindamycin 600mg Q8 hours for 7 days.  - OTC tylenols and NSAIDs for pain control  - Please follow up with ENT clinic or Dr. Wood in a week, please call [phone]  - Case discussed with Dr. Winters.

## 2020-11-19 NOTE — ED CDU PROVIDER SUBSEQUENT DAY NOTE - ATTENDING CONTRIBUTION TO CARE
ED Attending (Dr Baptiste): I have personally performed a face to face bedside history and physical examination of this patient.  I have discussed the case with the PA and  I have personally authored the following components: HPI, ROS, Physical Exam and MDM in addition to reviewing and revising the rest of the Provider Note. 61 y/o F with sore throat for 2 days, dx w/ tonsillar abscess. accepted to CDU for IV abx, and steroids. patient resting comfortably w/ no acute complaints. patient pending ENT reassessment.

## 2020-11-23 LAB
CULTURE RESULTS: SIGNIFICANT CHANGE UP
CULTURE RESULTS: SIGNIFICANT CHANGE UP
SPECIMEN SOURCE: SIGNIFICANT CHANGE UP
SPECIMEN SOURCE: SIGNIFICANT CHANGE UP

## 2020-12-02 ENCOUNTER — TRANSCRIPTION ENCOUNTER (OUTPATIENT)
Age: 60
End: 2020-12-02

## 2021-02-09 PROBLEM — Z78.9 OTHER SPECIFIED HEALTH STATUS: Chronic | Status: ACTIVE | Noted: 2020-11-18

## 2021-02-16 ENCOUNTER — APPOINTMENT (OUTPATIENT)
Dept: CT IMAGING | Facility: IMAGING CENTER | Age: 61
End: 2021-02-16
Payer: COMMERCIAL

## 2021-02-16 ENCOUNTER — OUTPATIENT (OUTPATIENT)
Dept: OUTPATIENT SERVICES | Facility: HOSPITAL | Age: 61
LOS: 1 days | End: 2021-02-16
Payer: COMMERCIAL

## 2021-02-16 ENCOUNTER — RESULT REVIEW (OUTPATIENT)
Age: 61
End: 2021-02-16

## 2021-02-16 DIAGNOSIS — Z00.8 ENCOUNTER FOR OTHER GENERAL EXAMINATION: ICD-10-CM

## 2021-02-16 DIAGNOSIS — K65.1 PERITONEAL ABSCESS: ICD-10-CM

## 2021-02-16 PROCEDURE — 70481 CT ORBIT/EAR/FOSSA W/DYE: CPT | Mod: 26

## 2021-02-16 PROCEDURE — 82565 ASSAY OF CREATININE: CPT

## 2021-02-16 PROCEDURE — 70481 CT ORBIT/EAR/FOSSA W/DYE: CPT

## 2021-03-11 ENCOUNTER — RESULT REVIEW (OUTPATIENT)
Age: 61
End: 2021-03-11

## 2021-03-11 ENCOUNTER — APPOINTMENT (OUTPATIENT)
Dept: ULTRASOUND IMAGING | Facility: HOSPITAL | Age: 61
End: 2021-03-11

## 2021-03-11 ENCOUNTER — OUTPATIENT (OUTPATIENT)
Dept: OUTPATIENT SERVICES | Facility: HOSPITAL | Age: 61
LOS: 1 days | End: 2021-03-11
Payer: COMMERCIAL

## 2021-03-11 PROCEDURE — 88305 TISSUE EXAM BY PATHOLOGIST: CPT

## 2021-03-11 PROCEDURE — 19083 BX BREAST 1ST LESION US IMAG: CPT | Mod: LT

## 2021-03-11 PROCEDURE — A4648: CPT

## 2021-03-11 PROCEDURE — 19083 BX BREAST 1ST LESION US IMAG: CPT

## 2021-03-11 PROCEDURE — 77065 DX MAMMO INCL CAD UNI: CPT

## 2021-03-11 PROCEDURE — 77065 DX MAMMO INCL CAD UNI: CPT | Mod: 26,LT

## 2021-03-11 PROCEDURE — 88305 TISSUE EXAM BY PATHOLOGIST: CPT | Mod: 26

## 2021-11-04 ENCOUNTER — TRANSCRIPTION ENCOUNTER (OUTPATIENT)
Age: 61
End: 2021-11-04

## 2022-03-01 ENCOUNTER — RESULT REVIEW (OUTPATIENT)
Age: 62
End: 2022-03-01

## 2022-04-07 ENCOUNTER — TRANSCRIPTION ENCOUNTER (OUTPATIENT)
Age: 62
End: 2022-04-07

## 2022-05-25 ENCOUNTER — APPOINTMENT (OUTPATIENT)
Dept: CARDIOLOGY | Facility: CLINIC | Age: 62
End: 2022-05-25
Payer: COMMERCIAL

## 2022-05-25 PROCEDURE — 93015 CV STRESS TEST SUPVJ I&R: CPT

## 2022-05-25 PROCEDURE — A9500: CPT

## 2022-05-25 PROCEDURE — 78452 HT MUSCLE IMAGE SPECT MULT: CPT

## 2022-05-25 PROCEDURE — 93306 TTE W/DOPPLER COMPLETE: CPT

## 2022-10-11 ENCOUNTER — OUTPATIENT (OUTPATIENT)
Dept: OUTPATIENT SERVICES | Facility: HOSPITAL | Age: 62
LOS: 1 days | End: 2022-10-11
Payer: COMMERCIAL

## 2022-10-11 ENCOUNTER — APPOINTMENT (OUTPATIENT)
Dept: NUCLEAR MEDICINE | Facility: IMAGING CENTER | Age: 62
End: 2022-10-11

## 2022-10-11 DIAGNOSIS — Z96.642 PRESENCE OF LEFT ARTIFICIAL HIP JOINT: ICD-10-CM

## 2022-10-11 PROCEDURE — 78830 RP LOCLZJ TUM SPECT W/CT 1: CPT

## 2022-10-11 PROCEDURE — 78315 BONE IMAGING 3 PHASE: CPT

## 2022-10-11 PROCEDURE — 78315 BONE IMAGING 3 PHASE: CPT | Mod: 26

## 2022-10-11 PROCEDURE — A9561: CPT

## 2022-10-11 PROCEDURE — 78830 RP LOCLZJ TUM SPECT W/CT 1: CPT | Mod: 26

## 2023-11-07 ENCOUNTER — NON-APPOINTMENT (OUTPATIENT)
Age: 63
End: 2023-11-07

## 2023-11-07 ENCOUNTER — APPOINTMENT (OUTPATIENT)
Dept: CARDIOLOGY | Facility: CLINIC | Age: 63
End: 2023-11-07
Payer: MEDICARE

## 2023-11-07 VITALS
BODY MASS INDEX: 23.89 KG/M2 | DIASTOLIC BLOOD PRESSURE: 78 MMHG | WEIGHT: 148 LBS | HEART RATE: 73 BPM | SYSTOLIC BLOOD PRESSURE: 113 MMHG | OXYGEN SATURATION: 97 %

## 2023-11-07 DIAGNOSIS — Z00.00 ENCOUNTER FOR GENERAL ADULT MEDICAL EXAMINATION W/OUT ABNORMAL FINDINGS: ICD-10-CM

## 2023-11-07 DIAGNOSIS — I25.10 ATHEROSCLEROTIC HEART DISEASE OF NATIVE CORONARY ARTERY W/OUT ANGINA PECTORIS: ICD-10-CM

## 2023-11-07 DIAGNOSIS — Z98.890 OTHER SPECIFIED POSTPROCEDURAL STATES: ICD-10-CM

## 2023-11-07 PROCEDURE — 99205 OFFICE O/P NEW HI 60 MIN: CPT

## 2023-11-07 PROCEDURE — 93000 ELECTROCARDIOGRAM COMPLETE: CPT

## 2023-11-14 ENCOUNTER — APPOINTMENT (OUTPATIENT)
Dept: CARDIOLOGY | Facility: CLINIC | Age: 63
End: 2023-11-14

## 2023-11-19 ENCOUNTER — NON-APPOINTMENT (OUTPATIENT)
Age: 63
End: 2023-11-19

## 2023-11-30 ENCOUNTER — TRANSCRIPTION ENCOUNTER (OUTPATIENT)
Age: 63
End: 2023-11-30

## 2023-12-15 ENCOUNTER — APPOINTMENT (OUTPATIENT)
Dept: CARDIOLOGY | Facility: CLINIC | Age: 63
End: 2023-12-15
Payer: COMMERCIAL

## 2023-12-15 PROCEDURE — 93306 TTE W/DOPPLER COMPLETE: CPT

## 2023-12-29 ENCOUNTER — APPOINTMENT (OUTPATIENT)
Dept: CARDIOLOGY | Facility: CLINIC | Age: 63
End: 2023-12-29

## 2024-01-15 ENCOUNTER — NON-APPOINTMENT (OUTPATIENT)
Age: 64
End: 2024-01-15

## 2024-06-30 ENCOUNTER — NON-APPOINTMENT (OUTPATIENT)
Age: 64
End: 2024-06-30

## 2024-07-02 ENCOUNTER — INPATIENT (INPATIENT)
Facility: HOSPITAL | Age: 64
LOS: 2 days | Discharge: HOME | End: 2024-07-05
Attending: OTOLARYNGOLOGY | Admitting: OTOLARYNGOLOGY
Payer: MEDICARE

## 2024-07-02 ENCOUNTER — TRANSCRIPTION ENCOUNTER (OUTPATIENT)
Age: 64
End: 2024-07-02

## 2024-07-02 VITALS
WEIGHT: 149.91 LBS | TEMPERATURE: 98 F | RESPIRATION RATE: 18 BRPM | SYSTOLIC BLOOD PRESSURE: 109 MMHG | OXYGEN SATURATION: 95 % | HEART RATE: 89 BPM | DIASTOLIC BLOOD PRESSURE: 74 MMHG | HEIGHT: 66 IN

## 2024-07-02 DIAGNOSIS — R50.9 FEVER, UNSPECIFIED: ICD-10-CM

## 2024-07-02 DIAGNOSIS — J38.7 OTHER DISEASES OF LARYNX: ICD-10-CM

## 2024-07-02 LAB
A1C WITH ESTIMATED AVERAGE GLUCOSE RESULT: 5.5 % — SIGNIFICANT CHANGE UP (ref 4–5.6)
ALBUMIN SERPL ELPH-MCNC: 4.1 G/DL — SIGNIFICANT CHANGE UP (ref 3.3–5)
ALP SERPL-CCNC: 93 U/L — SIGNIFICANT CHANGE UP (ref 40–120)
ALT FLD-CCNC: 37 U/L — HIGH (ref 4–33)
ANION GAP SERPL CALC-SCNC: 16 MMOL/L — HIGH (ref 7–14)
AST SERPL-CCNC: 33 U/L — HIGH (ref 4–32)
BASOPHILS # BLD AUTO: 0.04 K/UL — SIGNIFICANT CHANGE UP (ref 0–0.2)
BASOPHILS NFR BLD AUTO: 0.2 % — SIGNIFICANT CHANGE UP (ref 0–2)
BILIRUB SERPL-MCNC: 0.8 MG/DL — SIGNIFICANT CHANGE UP (ref 0.2–1.2)
BUN SERPL-MCNC: 16 MG/DL — SIGNIFICANT CHANGE UP (ref 7–23)
CALCIUM SERPL-MCNC: 8.7 MG/DL — SIGNIFICANT CHANGE UP (ref 8.4–10.5)
CHLORIDE SERPL-SCNC: 103 MMOL/L — SIGNIFICANT CHANGE UP (ref 98–107)
CO2 SERPL-SCNC: 22 MMOL/L — SIGNIFICANT CHANGE UP (ref 22–31)
CREAT SERPL-MCNC: 0.86 MG/DL — SIGNIFICANT CHANGE UP (ref 0.5–1.3)
EGFR: 74 ML/MIN/1.73M2 — SIGNIFICANT CHANGE UP
EOSINOPHIL # BLD AUTO: 0.01 K/UL — SIGNIFICANT CHANGE UP (ref 0–0.5)
EOSINOPHIL NFR BLD AUTO: 0.1 % — SIGNIFICANT CHANGE UP (ref 0–6)
ESTIMATED AVERAGE GLUCOSE: 111 — SIGNIFICANT CHANGE UP
GLUCOSE SERPL-MCNC: 109 MG/DL — HIGH (ref 70–99)
HCT VFR BLD CALC: 40.1 % — SIGNIFICANT CHANGE UP (ref 34.5–45)
HGB BLD-MCNC: 13.7 G/DL — SIGNIFICANT CHANGE UP (ref 11.5–15.5)
IANC: 15.3 K/UL — HIGH (ref 1.8–7.4)
IMM GRANULOCYTES NFR BLD AUTO: 0.7 % — SIGNIFICANT CHANGE UP (ref 0–0.9)
LYMPHOCYTES # BLD AUTO: 0.8 K/UL — LOW (ref 1–3.3)
LYMPHOCYTES # BLD AUTO: 4.8 % — LOW (ref 13–44)
MCHC RBC-ENTMCNC: 29.1 PG — SIGNIFICANT CHANGE UP (ref 27–34)
MCHC RBC-ENTMCNC: 34.2 GM/DL — SIGNIFICANT CHANGE UP (ref 32–36)
MCV RBC AUTO: 85.1 FL — SIGNIFICANT CHANGE UP (ref 80–100)
MONOCYTES # BLD AUTO: 0.47 K/UL — SIGNIFICANT CHANGE UP (ref 0–0.9)
MONOCYTES NFR BLD AUTO: 2.8 % — SIGNIFICANT CHANGE UP (ref 2–14)
NEUTROPHILS # BLD AUTO: 15.3 K/UL — HIGH (ref 1.8–7.4)
NEUTROPHILS NFR BLD AUTO: 91.4 % — HIGH (ref 43–77)
NRBC # BLD: 0 /100 WBCS — SIGNIFICANT CHANGE UP (ref 0–0)
NRBC # FLD: 0 K/UL — SIGNIFICANT CHANGE UP (ref 0–0)
PLATELET # BLD AUTO: 267 K/UL — SIGNIFICANT CHANGE UP (ref 150–400)
POTASSIUM SERPL-MCNC: 3.7 MMOL/L — SIGNIFICANT CHANGE UP (ref 3.5–5.3)
POTASSIUM SERPL-SCNC: 3.7 MMOL/L — SIGNIFICANT CHANGE UP (ref 3.5–5.3)
PROT SERPL-MCNC: 7.5 G/DL — SIGNIFICANT CHANGE UP (ref 6–8.3)
RBC # BLD: 4.71 M/UL — SIGNIFICANT CHANGE UP (ref 3.8–5.2)
RBC # FLD: 13.7 % — SIGNIFICANT CHANGE UP (ref 10.3–14.5)
SODIUM SERPL-SCNC: 141 MMOL/L — SIGNIFICANT CHANGE UP (ref 135–145)
WBC # BLD: 16.74 K/UL — HIGH (ref 3.8–10.5)
WBC # FLD AUTO: 16.74 K/UL — HIGH (ref 3.8–10.5)

## 2024-07-02 PROCEDURE — 70491 CT SOFT TISSUE NECK W/DYE: CPT | Mod: 26,MC

## 2024-07-02 PROCEDURE — 99285 EMERGENCY DEPT VISIT HI MDM: CPT | Mod: GC

## 2024-07-02 RX ORDER — AMPICILLIN AND SULBACTAM 2; 1 G/20ML; G/20ML
3 INJECTION, POWDER, FOR SOLUTION INTRAMUSCULAR; INTRAVENOUS ONCE
Refills: 0 | Status: COMPLETED | OUTPATIENT
Start: 2024-07-03 | End: 2024-07-03

## 2024-07-02 RX ORDER — ACETAMINOPHEN 325 MG
1000 TABLET ORAL ONCE
Refills: 0 | Status: COMPLETED | OUTPATIENT
Start: 2024-07-02 | End: 2024-07-02

## 2024-07-02 RX ORDER — SENNOSIDES 8.6 MG
2 TABLET ORAL AT BEDTIME
Refills: 0 | Status: DISCONTINUED | OUTPATIENT
Start: 2024-07-02 | End: 2024-07-05

## 2024-07-02 RX ORDER — ACETAMINOPHEN 325 MG
650 TABLET ORAL EVERY 6 HOURS
Refills: 0 | Status: DISCONTINUED | OUTPATIENT
Start: 2024-07-02 | End: 2024-07-03

## 2024-07-02 RX ORDER — AMPICILLIN AND SULBACTAM 2; 1 G/20ML; G/20ML
3 INJECTION, POWDER, FOR SOLUTION INTRAMUSCULAR; INTRAVENOUS EVERY 6 HOURS
Refills: 0 | Status: DISCONTINUED | OUTPATIENT
Start: 2024-07-03 | End: 2024-07-05

## 2024-07-02 RX ORDER — POLYETHYLENE GLYCOL 3350 1 G/G
17 POWDER ORAL DAILY
Refills: 0 | Status: DISCONTINUED | OUTPATIENT
Start: 2024-07-02 | End: 2024-07-05

## 2024-07-02 RX ORDER — AMPICILLIN AND SULBACTAM 2; 1 G/20ML; G/20ML
3 INJECTION, POWDER, FOR SOLUTION INTRAMUSCULAR; INTRAVENOUS ONCE
Refills: 0 | Status: COMPLETED | OUTPATIENT
Start: 2024-07-02 | End: 2024-07-02

## 2024-07-02 RX ORDER — AMPICILLIN AND SULBACTAM 2; 1 G/20ML; G/20ML
INJECTION, POWDER, FOR SOLUTION INTRAMUSCULAR; INTRAVENOUS
Refills: 0 | Status: DISCONTINUED | OUTPATIENT
Start: 2024-07-03 | End: 2024-07-05

## 2024-07-02 RX ORDER — DEXAMETHASONE 1 MG/1
10 TABLET ORAL ONCE
Refills: 0 | Status: COMPLETED | OUTPATIENT
Start: 2024-07-02 | End: 2024-07-02

## 2024-07-02 RX ORDER — DEXTROSE MONOHYDRATE AND SODIUM CHLORIDE 5; .3 G/100ML; G/100ML
1000 INJECTION, SOLUTION INTRAVENOUS
Refills: 0 | Status: DISCONTINUED | OUTPATIENT
Start: 2024-07-02 | End: 2024-07-03

## 2024-07-02 RX ORDER — DEXAMETHASONE 1 MG/1
10 TABLET ORAL EVERY 8 HOURS
Refills: 0 | Status: DISCONTINUED | OUTPATIENT
Start: 2024-07-02 | End: 2024-07-05

## 2024-07-02 RX ORDER — MORPHINE SULFATE 100 MG/1
4 TABLET, EXTENDED RELEASE ORAL
Refills: 0 | Status: DISCONTINUED | OUTPATIENT
Start: 2024-07-02 | End: 2024-07-02

## 2024-07-02 RX ADMIN — Medication 1000 MILLIGRAM(S): at 20:22

## 2024-07-02 RX ADMIN — DEXAMETHASONE 10 MILLIGRAM(S): 1 TABLET ORAL at 20:22

## 2024-07-02 RX ADMIN — DEXAMETHASONE 102 MILLIGRAM(S): 1 TABLET ORAL at 19:31

## 2024-07-02 RX ADMIN — AMPICILLIN AND SULBACTAM 200 GRAM(S): 2; 1 INJECTION, POWDER, FOR SOLUTION INTRAMUSCULAR; INTRAVENOUS at 22:36

## 2024-07-02 RX ADMIN — Medication 400 MILLIGRAM(S): at 18:31

## 2024-07-03 ENCOUNTER — RESULT REVIEW (OUTPATIENT)
Age: 64
End: 2024-07-03

## 2024-07-03 DIAGNOSIS — J39.0 RETROPHARYNGEAL AND PARAPHARYNGEAL ABSCESS: ICD-10-CM

## 2024-07-03 DIAGNOSIS — Z01.818 ENCOUNTER FOR OTHER PREPROCEDURAL EXAMINATION: ICD-10-CM

## 2024-07-03 LAB
ALBUMIN SERPL ELPH-MCNC: 3.9 G/DL — SIGNIFICANT CHANGE UP (ref 3.3–5)
ALP SERPL-CCNC: 103 U/L — SIGNIFICANT CHANGE UP (ref 40–120)
ALT FLD-CCNC: 40 U/L — HIGH (ref 4–33)
ANION GAP SERPL CALC-SCNC: 12 MMOL/L — SIGNIFICANT CHANGE UP (ref 7–14)
APTT BLD: 30.3 SEC — SIGNIFICANT CHANGE UP (ref 24.5–35.6)
AST SERPL-CCNC: 32 U/L — SIGNIFICANT CHANGE UP (ref 4–32)
BILIRUB SERPL-MCNC: 0.5 MG/DL — SIGNIFICANT CHANGE UP (ref 0.2–1.2)
BLD GP AB SCN SERPL QL: NEGATIVE — SIGNIFICANT CHANGE UP
BUN SERPL-MCNC: 13 MG/DL — SIGNIFICANT CHANGE UP (ref 7–23)
CALCIUM SERPL-MCNC: 8.2 MG/DL — LOW (ref 8.4–10.5)
CHLORIDE SERPL-SCNC: 104 MMOL/L — SIGNIFICANT CHANGE UP (ref 98–107)
CO2 SERPL-SCNC: 23 MMOL/L — SIGNIFICANT CHANGE UP (ref 22–31)
CREAT SERPL-MCNC: 0.68 MG/DL — SIGNIFICANT CHANGE UP (ref 0.5–1.3)
EGFR: 96 ML/MIN/1.73M2 — SIGNIFICANT CHANGE UP
GLUCOSE SERPL-MCNC: 146 MG/DL — HIGH (ref 70–99)
HCT VFR BLD CALC: 44.1 % — SIGNIFICANT CHANGE UP (ref 34.5–45)
HGB BLD-MCNC: 14.5 G/DL — SIGNIFICANT CHANGE UP (ref 11.5–15.5)
MAGNESIUM SERPL-MCNC: 2.2 MG/DL — SIGNIFICANT CHANGE UP (ref 1.6–2.6)
MCHC RBC-ENTMCNC: 28.9 PG — SIGNIFICANT CHANGE UP (ref 27–34)
MCHC RBC-ENTMCNC: 32.9 GM/DL — SIGNIFICANT CHANGE UP (ref 32–36)
MCV RBC AUTO: 87.8 FL — SIGNIFICANT CHANGE UP (ref 80–100)
NRBC # BLD: 0 /100 WBCS — SIGNIFICANT CHANGE UP (ref 0–0)
NRBC # FLD: 0 K/UL — SIGNIFICANT CHANGE UP (ref 0–0)
PHOSPHATE SERPL-MCNC: 2.2 MG/DL — LOW (ref 2.5–4.5)
PLATELET # BLD AUTO: 317 K/UL — SIGNIFICANT CHANGE UP (ref 150–400)
POTASSIUM SERPL-MCNC: 4 MMOL/L — SIGNIFICANT CHANGE UP (ref 3.5–5.3)
POTASSIUM SERPL-SCNC: 4 MMOL/L — SIGNIFICANT CHANGE UP (ref 3.5–5.3)
PROT SERPL-MCNC: 7.2 G/DL — SIGNIFICANT CHANGE UP (ref 6–8.3)
RBC # BLD: 5.02 M/UL — SIGNIFICANT CHANGE UP (ref 3.8–5.2)
RBC # FLD: 13.5 % — SIGNIFICANT CHANGE UP (ref 10.3–14.5)
RH IG SCN BLD-IMP: POSITIVE — SIGNIFICANT CHANGE UP
SODIUM SERPL-SCNC: 139 MMOL/L — SIGNIFICANT CHANGE UP (ref 135–145)
WBC # BLD: 17.06 K/UL — HIGH (ref 3.8–10.5)
WBC # FLD AUTO: 17.06 K/UL — HIGH (ref 3.8–10.5)

## 2024-07-03 PROCEDURE — 99223 1ST HOSP IP/OBS HIGH 75: CPT | Mod: GC,25,57

## 2024-07-03 PROCEDURE — 76376 3D RENDER W/INTRP POSTPROCES: CPT | Mod: 26

## 2024-07-03 PROCEDURE — 31575 DIAGNOSTIC LARYNGOSCOPY: CPT | Mod: GC

## 2024-07-03 PROCEDURE — 99223 1ST HOSP IP/OBS HIGH 75: CPT

## 2024-07-03 PROCEDURE — 99222 1ST HOSP IP/OBS MODERATE 55: CPT

## 2024-07-03 PROCEDURE — 93306 TTE W/DOPPLER COMPLETE: CPT | Mod: 26

## 2024-07-03 PROCEDURE — 93010 ELECTROCARDIOGRAM REPORT: CPT

## 2024-07-03 PROCEDURE — 42720 I&D ABSC PHRNGL NTRAORL APPR: CPT | Mod: GC

## 2024-07-03 RX ORDER — FENTANYL CITRATE 50 UG/ML
25 INJECTION, SOLUTION INTRAMUSCULAR; INTRAVENOUS
Refills: 0 | Status: DISCONTINUED | OUTPATIENT
Start: 2024-07-03 | End: 2024-07-03

## 2024-07-03 RX ORDER — FLUOXETINE HCL 40 MG
30 CAPSULE ORAL DAILY
Refills: 0 | Status: DISCONTINUED | OUTPATIENT
Start: 2024-07-03 | End: 2024-07-05

## 2024-07-03 RX ORDER — ACETAMINOPHEN 325 MG
325 TABLET ORAL EVERY 4 HOURS
Refills: 0 | Status: DISCONTINUED | OUTPATIENT
Start: 2024-07-03 | End: 2024-07-03

## 2024-07-03 RX ORDER — DEXTROSE MONOHYDRATE AND SODIUM CHLORIDE 5; .3 G/100ML; G/100ML
1000 INJECTION, SOLUTION INTRAVENOUS
Refills: 0 | Status: DISCONTINUED | OUTPATIENT
Start: 2024-07-03 | End: 2024-07-05

## 2024-07-03 RX ORDER — FLUOXETINE HCL 40 MG
30 CAPSULE ORAL ONCE
Refills: 0 | Status: DISCONTINUED | OUTPATIENT
Start: 2024-07-03 | End: 2024-07-03

## 2024-07-03 RX ORDER — ASPIRIN 325 MG/1
81 TABLET, FILM COATED ORAL DAILY
Refills: 0 | Status: DISCONTINUED | OUTPATIENT
Start: 2024-07-03 | End: 2024-07-05

## 2024-07-03 RX ORDER — ENOXAPARIN SODIUM 100 MG/ML
40 INJECTION SUBCUTANEOUS EVERY 24 HOURS
Refills: 0 | Status: DISCONTINUED | OUTPATIENT
Start: 2024-07-04 | End: 2024-07-05

## 2024-07-03 RX ORDER — ACETAMINOPHEN 325 MG
1000 TABLET ORAL ONCE
Refills: 0 | Status: COMPLETED | OUTPATIENT
Start: 2024-07-03 | End: 2024-07-03

## 2024-07-03 RX ORDER — ONDANSETRON HYDROCHLORIDE 2 MG/ML
4 INJECTION INTRAMUSCULAR; INTRAVENOUS ONCE
Refills: 0 | Status: DISCONTINUED | OUTPATIENT
Start: 2024-07-03 | End: 2024-07-03

## 2024-07-03 RX ORDER — MORPHINE SULFATE 100 MG/1
4 TABLET, EXTENDED RELEASE ORAL EVERY 6 HOURS
Refills: 0 | Status: DISCONTINUED | OUTPATIENT
Start: 2024-07-03 | End: 2024-07-05

## 2024-07-03 RX ORDER — METOPROLOL TARTRATE 50 MG
12.5 TABLET ORAL DAILY
Refills: 0 | Status: DISCONTINUED | OUTPATIENT
Start: 2024-07-03 | End: 2024-07-05

## 2024-07-03 RX ORDER — DIPHENHYDRAMINE HCL 12.5MG/5ML
25 ELIXIR ORAL ONCE
Refills: 0 | Status: COMPLETED | OUTPATIENT
Start: 2024-07-03 | End: 2024-07-03

## 2024-07-03 RX ORDER — ATORVASTATIN CALCIUM 20 MG/1
20 TABLET, FILM COATED ORAL AT BEDTIME
Refills: 0 | Status: DISCONTINUED | OUTPATIENT
Start: 2024-07-03 | End: 2024-07-05

## 2024-07-03 RX ORDER — POTASSIUM PHOSPHATE, MONOBASIC POTASSIUM PHOSPHATE, DIBASIC INJECTION, 236; 224 MG/ML; MG/ML
15 SOLUTION, CONCENTRATE INTRAVENOUS ONCE
Refills: 0 | Status: COMPLETED | OUTPATIENT
Start: 2024-07-03 | End: 2024-07-03

## 2024-07-03 RX ORDER — METOPROLOL TARTRATE 50 MG
12.5 TABLET ORAL DAILY
Refills: 0 | Status: DISCONTINUED | OUTPATIENT
Start: 2024-07-03 | End: 2024-07-03

## 2024-07-03 RX ORDER — ACETAMINOPHEN 325 MG
650 TABLET ORAL EVERY 6 HOURS
Refills: 0 | Status: DISCONTINUED | OUTPATIENT
Start: 2024-07-03 | End: 2024-07-03

## 2024-07-03 RX ORDER — MORPHINE SULFATE 100 MG/1
2 TABLET, EXTENDED RELEASE ORAL EVERY 4 HOURS
Refills: 0 | Status: DISCONTINUED | OUTPATIENT
Start: 2024-07-03 | End: 2024-07-05

## 2024-07-03 RX ADMIN — Medication 15 MILLILITER(S): at 23:19

## 2024-07-03 RX ADMIN — AMPICILLIN AND SULBACTAM 200 GRAM(S): 2; 1 INJECTION, POWDER, FOR SOLUTION INTRAMUSCULAR; INTRAVENOUS at 05:11

## 2024-07-03 RX ADMIN — ATORVASTATIN CALCIUM 20 MILLIGRAM(S): 20 TABLET, FILM COATED ORAL at 22:57

## 2024-07-03 RX ADMIN — Medication 12.5 MILLIGRAM(S): at 23:10

## 2024-07-03 RX ADMIN — MORPHINE SULFATE 4 MILLIGRAM(S): 100 TABLET, EXTENDED RELEASE ORAL at 10:28

## 2024-07-03 RX ADMIN — MORPHINE SULFATE 4 MILLIGRAM(S): 100 TABLET, EXTENDED RELEASE ORAL at 15:42

## 2024-07-03 RX ADMIN — Medication 1000 MILLIGRAM(S): at 13:47

## 2024-07-03 RX ADMIN — MORPHINE SULFATE 4 MILLIGRAM(S): 100 TABLET, EXTENDED RELEASE ORAL at 17:39

## 2024-07-03 RX ADMIN — POTASSIUM PHOSPHATE, MONOBASIC POTASSIUM PHOSPHATE, DIBASIC INJECTION, 62.5 MILLIMOLE(S): 236; 224 SOLUTION, CONCENTRATE INTRAVENOUS at 09:40

## 2024-07-03 RX ADMIN — DEXAMETHASONE 102 MILLIGRAM(S): 1 TABLET ORAL at 22:35

## 2024-07-03 RX ADMIN — AMPICILLIN AND SULBACTAM 200 GRAM(S): 2; 1 INJECTION, POWDER, FOR SOLUTION INTRAMUSCULAR; INTRAVENOUS at 12:51

## 2024-07-03 RX ADMIN — Medication 400 MILLIGRAM(S): at 13:27

## 2024-07-03 RX ADMIN — DEXAMETHASONE 102 MILLIGRAM(S): 1 TABLET ORAL at 05:12

## 2024-07-03 RX ADMIN — DEXTROSE MONOHYDRATE AND SODIUM CHLORIDE 95 MILLILITER(S): 5; .3 INJECTION, SOLUTION INTRAVENOUS at 00:21

## 2024-07-03 RX ADMIN — Medication 2 TABLET(S): at 23:11

## 2024-07-03 RX ADMIN — Medication 25 MILLIGRAM(S): at 02:09

## 2024-07-03 RX ADMIN — DEXAMETHASONE 102 MILLIGRAM(S): 1 TABLET ORAL at 15:35

## 2024-07-03 RX ADMIN — Medication 650 MILLIGRAM(S): at 05:49

## 2024-07-03 RX ADMIN — Medication 30 MILLIGRAM(S): at 23:11

## 2024-07-03 RX ADMIN — MORPHINE SULFATE 4 MILLIGRAM(S): 100 TABLET, EXTENDED RELEASE ORAL at 09:40

## 2024-07-04 DIAGNOSIS — J39.0 RETROPHARYNGEAL AND PARAPHARYNGEAL ABSCESS: ICD-10-CM

## 2024-07-04 DIAGNOSIS — I25.10 ATHEROSCLEROTIC HEART DISEASE OF NATIVE CORONARY ARTERY WITHOUT ANGINA PECTORIS: ICD-10-CM

## 2024-07-04 DIAGNOSIS — E83.51 HYPOCALCEMIA: ICD-10-CM

## 2024-07-04 LAB
ANION GAP SERPL CALC-SCNC: 13 MMOL/L — SIGNIFICANT CHANGE UP (ref 7–14)
BUN SERPL-MCNC: 17 MG/DL — SIGNIFICANT CHANGE UP (ref 7–23)
CALCIUM SERPL-MCNC: 7.7 MG/DL — LOW (ref 8.4–10.5)
CHLORIDE SERPL-SCNC: 105 MMOL/L — SIGNIFICANT CHANGE UP (ref 98–107)
CO2 SERPL-SCNC: 21 MMOL/L — LOW (ref 22–31)
CREAT SERPL-MCNC: 0.68 MG/DL — SIGNIFICANT CHANGE UP (ref 0.5–1.3)
EGFR: 96 ML/MIN/1.73M2 — SIGNIFICANT CHANGE UP
GLUCOSE SERPL-MCNC: 133 MG/DL — HIGH (ref 70–99)
GRAM STN FLD: SIGNIFICANT CHANGE UP
HCT VFR BLD CALC: 41.3 % — SIGNIFICANT CHANGE UP (ref 34.5–45)
HGB BLD-MCNC: 13.9 G/DL — SIGNIFICANT CHANGE UP (ref 11.5–15.5)
MAGNESIUM SERPL-MCNC: 2.3 MG/DL — SIGNIFICANT CHANGE UP (ref 1.6–2.6)
MCHC RBC-ENTMCNC: 29.3 PG — SIGNIFICANT CHANGE UP (ref 27–34)
MCHC RBC-ENTMCNC: 33.7 GM/DL — SIGNIFICANT CHANGE UP (ref 32–36)
MCV RBC AUTO: 87.1 FL — SIGNIFICANT CHANGE UP (ref 80–100)
NRBC # BLD: 0 /100 WBCS — SIGNIFICANT CHANGE UP (ref 0–0)
NRBC # FLD: 0 K/UL — SIGNIFICANT CHANGE UP (ref 0–0)
PHOSPHATE SERPL-MCNC: 1.8 MG/DL — LOW (ref 2.5–4.5)
PLATELET # BLD AUTO: 330 K/UL — SIGNIFICANT CHANGE UP (ref 150–400)
POTASSIUM SERPL-MCNC: 4 MMOL/L — SIGNIFICANT CHANGE UP (ref 3.5–5.3)
POTASSIUM SERPL-SCNC: 4 MMOL/L — SIGNIFICANT CHANGE UP (ref 3.5–5.3)
RBC # BLD: 4.74 M/UL — SIGNIFICANT CHANGE UP (ref 3.8–5.2)
RBC # FLD: 13.8 % — SIGNIFICANT CHANGE UP (ref 10.3–14.5)
SODIUM SERPL-SCNC: 139 MMOL/L — SIGNIFICANT CHANGE UP (ref 135–145)
SPECIMEN SOURCE: SIGNIFICANT CHANGE UP
WBC # BLD: 16.27 K/UL — HIGH (ref 3.8–10.5)
WBC # FLD AUTO: 16.27 K/UL — HIGH (ref 3.8–10.5)

## 2024-07-04 PROCEDURE — 99233 SBSQ HOSP IP/OBS HIGH 50: CPT

## 2024-07-04 RX ORDER — SOD PHOS DI, MONO/K PHOS MONO 250 MG
3 TABLET ORAL ONCE
Refills: 0 | Status: DISCONTINUED | OUTPATIENT
Start: 2024-07-04 | End: 2024-07-04

## 2024-07-04 RX ORDER — POTASSIUM PHOSPHATE, MONOBASIC POTASSIUM PHOSPHATE, DIBASIC INJECTION, 236; 224 MG/ML; MG/ML
21 SOLUTION, CONCENTRATE INTRAVENOUS ONCE
Refills: 0 | Status: DISCONTINUED | OUTPATIENT
Start: 2024-07-04 | End: 2024-07-04

## 2024-07-04 RX ORDER — POTASSIUM PHOSPHATE, MONOBASIC POTASSIUM PHOSPHATE, DIBASIC INJECTION, 236; 224 MG/ML; MG/ML
15 SOLUTION, CONCENTRATE INTRAVENOUS ONCE
Refills: 0 | Status: COMPLETED | OUTPATIENT
Start: 2024-07-04 | End: 2024-07-04

## 2024-07-04 RX ORDER — CALCIUM GLUCONATE 98 MG/ML
1 INJECTION, SOLUTION INTRAVENOUS ONCE
Refills: 0 | Status: COMPLETED | OUTPATIENT
Start: 2024-07-04 | End: 2024-07-04

## 2024-07-04 RX ADMIN — AMPICILLIN AND SULBACTAM 200 GRAM(S): 2; 1 INJECTION, POWDER, FOR SOLUTION INTRAMUSCULAR; INTRAVENOUS at 17:07

## 2024-07-04 RX ADMIN — DEXTROSE MONOHYDRATE AND SODIUM CHLORIDE 100 MILLILITER(S): 5; .3 INJECTION, SOLUTION INTRAVENOUS at 09:42

## 2024-07-04 RX ADMIN — Medication 15 MILLILITER(S): at 09:42

## 2024-07-04 RX ADMIN — AMPICILLIN AND SULBACTAM 200 GRAM(S): 2; 1 INJECTION, POWDER, FOR SOLUTION INTRAMUSCULAR; INTRAVENOUS at 00:01

## 2024-07-04 RX ADMIN — AMPICILLIN AND SULBACTAM 200 GRAM(S): 2; 1 INJECTION, POWDER, FOR SOLUTION INTRAMUSCULAR; INTRAVENOUS at 11:17

## 2024-07-04 RX ADMIN — POTASSIUM PHOSPHATE, MONOBASIC POTASSIUM PHOSPHATE, DIBASIC INJECTION, 62.5 MILLIMOLE(S): 236; 224 SOLUTION, CONCENTRATE INTRAVENOUS at 11:17

## 2024-07-04 RX ADMIN — Medication 3 MILLIGRAM(S): at 23:37

## 2024-07-04 RX ADMIN — ASPIRIN 81 MILLIGRAM(S): 325 TABLET, FILM COATED ORAL at 17:08

## 2024-07-04 RX ADMIN — AMPICILLIN AND SULBACTAM 200 GRAM(S): 2; 1 INJECTION, POWDER, FOR SOLUTION INTRAMUSCULAR; INTRAVENOUS at 06:56

## 2024-07-04 RX ADMIN — ENOXAPARIN SODIUM 40 MILLIGRAM(S): 100 INJECTION SUBCUTANEOUS at 13:47

## 2024-07-04 RX ADMIN — DEXAMETHASONE 102 MILLIGRAM(S): 1 TABLET ORAL at 06:00

## 2024-07-04 RX ADMIN — CALCIUM GLUCONATE 100 GRAM(S): 98 INJECTION, SOLUTION INTRAVENOUS at 11:17

## 2024-07-04 RX ADMIN — Medication 30 MILLIGRAM(S): at 17:08

## 2024-07-04 RX ADMIN — DEXAMETHASONE 102 MILLIGRAM(S): 1 TABLET ORAL at 13:12

## 2024-07-04 RX ADMIN — Medication 2 TABLET(S): at 21:17

## 2024-07-04 RX ADMIN — AMPICILLIN AND SULBACTAM 200 GRAM(S): 2; 1 INJECTION, POWDER, FOR SOLUTION INTRAMUSCULAR; INTRAVENOUS at 23:41

## 2024-07-04 RX ADMIN — Medication 15 MILLILITER(S): at 13:12

## 2024-07-04 RX ADMIN — MORPHINE SULFATE 4 MILLIGRAM(S): 100 TABLET, EXTENDED RELEASE ORAL at 11:32

## 2024-07-04 RX ADMIN — Medication 12.5 MILLIGRAM(S): at 17:08

## 2024-07-04 RX ADMIN — DEXAMETHASONE 102 MILLIGRAM(S): 1 TABLET ORAL at 21:18

## 2024-07-04 RX ADMIN — MORPHINE SULFATE 4 MILLIGRAM(S): 100 TABLET, EXTENDED RELEASE ORAL at 11:17

## 2024-07-04 RX ADMIN — Medication 15 MILLILITER(S): at 21:18

## 2024-07-04 RX ADMIN — ATORVASTATIN CALCIUM 20 MILLIGRAM(S): 20 TABLET, FILM COATED ORAL at 21:18

## 2024-07-05 ENCOUNTER — TRANSCRIPTION ENCOUNTER (OUTPATIENT)
Age: 64
End: 2024-07-05

## 2024-07-05 VITALS
DIASTOLIC BLOOD PRESSURE: 69 MMHG | OXYGEN SATURATION: 96 % | HEART RATE: 72 BPM | TEMPERATURE: 99 F | RESPIRATION RATE: 18 BRPM | SYSTOLIC BLOOD PRESSURE: 126 MMHG

## 2024-07-05 LAB
ANION GAP SERPL CALC-SCNC: 14 MMOL/L — SIGNIFICANT CHANGE UP (ref 7–14)
BUN SERPL-MCNC: 15 MG/DL — SIGNIFICANT CHANGE UP (ref 7–23)
CALCIUM SERPL-MCNC: 7.9 MG/DL — LOW (ref 8.4–10.5)
CHLORIDE SERPL-SCNC: 107 MMOL/L — SIGNIFICANT CHANGE UP (ref 98–107)
CO2 SERPL-SCNC: 20 MMOL/L — LOW (ref 22–31)
CREAT SERPL-MCNC: 0.56 MG/DL — SIGNIFICANT CHANGE UP (ref 0.5–1.3)
EGFR: 101 ML/MIN/1.73M2 — SIGNIFICANT CHANGE UP
GLUCOSE SERPL-MCNC: 163 MG/DL — HIGH (ref 70–99)
HCT VFR BLD CALC: 36.8 % — SIGNIFICANT CHANGE UP (ref 34.5–45)
HGB BLD-MCNC: 12.2 G/DL — SIGNIFICANT CHANGE UP (ref 11.5–15.5)
MAGNESIUM SERPL-MCNC: 2.4 MG/DL — SIGNIFICANT CHANGE UP (ref 1.6–2.6)
MCHC RBC-ENTMCNC: 28.6 PG — SIGNIFICANT CHANGE UP (ref 27–34)
MCHC RBC-ENTMCNC: 33.2 GM/DL — SIGNIFICANT CHANGE UP (ref 32–36)
MCV RBC AUTO: 86.4 FL — SIGNIFICANT CHANGE UP (ref 80–100)
NRBC # BLD: 0 /100 WBCS — SIGNIFICANT CHANGE UP (ref 0–0)
NRBC # FLD: 0 K/UL — SIGNIFICANT CHANGE UP (ref 0–0)
PHOSPHATE SERPL-MCNC: 1.8 MG/DL — LOW (ref 2.5–4.5)
PLATELET # BLD AUTO: 321 K/UL — SIGNIFICANT CHANGE UP (ref 150–400)
POTASSIUM SERPL-MCNC: 4.3 MMOL/L — SIGNIFICANT CHANGE UP (ref 3.5–5.3)
POTASSIUM SERPL-SCNC: 4.3 MMOL/L — SIGNIFICANT CHANGE UP (ref 3.5–5.3)
RBC # BLD: 4.26 M/UL — SIGNIFICANT CHANGE UP (ref 3.8–5.2)
RBC # FLD: 13.6 % — SIGNIFICANT CHANGE UP (ref 10.3–14.5)
SODIUM SERPL-SCNC: 141 MMOL/L — SIGNIFICANT CHANGE UP (ref 135–145)
WBC # BLD: 12.65 K/UL — HIGH (ref 3.8–10.5)
WBC # FLD AUTO: 12.65 K/UL — HIGH (ref 3.8–10.5)

## 2024-07-05 RX ORDER — PANTOPRAZOLE SODIUM 40 MG/10ML
1 INJECTION, POWDER, FOR SOLUTION INTRAVENOUS
Qty: 5 | Refills: 0
Start: 2024-07-05 | End: 2024-07-09

## 2024-07-05 RX ORDER — FLUOXETINE HCL 40 MG
3 CAPSULE ORAL
Qty: 0 | Refills: 0 | DISCHARGE
Start: 2024-07-05

## 2024-07-05 RX ORDER — METHYLPREDNISOLONE ACETATE 20 MG/ML
1 VIAL (ML) INJECTION
Qty: 1 | Refills: 0
Start: 2024-07-05 | End: 2024-07-09

## 2024-07-05 RX ORDER — ASPIRIN 325 MG/1
1 TABLET, FILM COATED ORAL
Qty: 0 | Refills: 0 | DISCHARGE
Start: 2024-07-05

## 2024-07-05 RX ORDER — AMOXICILLIN/POTASSIUM CLAV 250-125 MG
1 TABLET ORAL
Qty: 18 | Refills: 0
Start: 2024-07-05 | End: 2024-07-13

## 2024-07-05 RX ORDER — METOPROLOL TARTRATE 50 MG
1 TABLET ORAL
Qty: 0 | Refills: 0 | DISCHARGE
Start: 2024-07-05

## 2024-07-05 RX ORDER — OXYCODONE HYDROCHLORIDE 100 MG/5ML
1 SOLUTION ORAL
Qty: 9 | Refills: 0
Start: 2024-07-05 | End: 2024-07-07

## 2024-07-05 RX ORDER — ATORVASTATIN CALCIUM 20 MG/1
1 TABLET, FILM COATED ORAL
Qty: 0 | Refills: 0 | DISCHARGE
Start: 2024-07-05

## 2024-07-05 RX ADMIN — DEXAMETHASONE 102 MILLIGRAM(S): 1 TABLET ORAL at 05:17

## 2024-07-05 RX ADMIN — AMPICILLIN AND SULBACTAM 200 GRAM(S): 2; 1 INJECTION, POWDER, FOR SOLUTION INTRAMUSCULAR; INTRAVENOUS at 05:18

## 2024-07-05 RX ADMIN — Medication 15 MILLILITER(S): at 07:32

## 2024-07-06 LAB
CULTURE RESULTS: ABNORMAL
CULTURE RESULTS: ABNORMAL
GRAM STN FLD: ABNORMAL
SPECIMEN SOURCE: SIGNIFICANT CHANGE UP

## 2024-07-15 ENCOUNTER — NON-APPOINTMENT (OUTPATIENT)
Age: 64
End: 2024-07-15

## 2024-07-16 ENCOUNTER — APPOINTMENT (OUTPATIENT)
Dept: OTOLARYNGOLOGY | Facility: CLINIC | Age: 64
End: 2024-07-16
Payer: MEDICARE

## 2024-07-16 VITALS
DIASTOLIC BLOOD PRESSURE: 76 MMHG | HEART RATE: 82 BPM | BODY MASS INDEX: 22.82 KG/M2 | OXYGEN SATURATION: 98 % | TEMPERATURE: 97.8 F | HEIGHT: 66 IN | WEIGHT: 142 LBS | SYSTOLIC BLOOD PRESSURE: 110 MMHG

## 2024-07-16 DIAGNOSIS — J39.0 RETROPHARYNGEAL AND PARAPHARYNGEAL ABSCESS: ICD-10-CM

## 2024-07-16 PROCEDURE — 31575 DIAGNOSTIC LARYNGOSCOPY: CPT

## 2024-07-16 PROCEDURE — 99214 OFFICE O/P EST MOD 30 MIN: CPT | Mod: 24,25

## 2024-07-16 PROCEDURE — 76536 US EXAM OF HEAD AND NECK: CPT

## 2024-07-16 RX ORDER — ROSUVASTATIN CALCIUM 5 MG/1
TABLET, FILM COATED ORAL
Refills: 0 | Status: ACTIVE | COMMUNITY

## 2024-07-16 RX ORDER — ALENDRONATE SODIUM 70 MG/75ML
70 SOLUTION, ORAL ORAL
Refills: 0 | Status: ACTIVE | COMMUNITY

## 2024-07-16 RX ORDER — METOPROLOL TARTRATE 75 MG/1
TABLET, FILM COATED ORAL
Refills: 0 | Status: ACTIVE | COMMUNITY

## 2024-07-16 RX ORDER — CLINDAMYCIN HYDROCHLORIDE 300 MG/1
300 CAPSULE ORAL
Qty: 28 | Refills: 0 | Status: ACTIVE | COMMUNITY
Start: 2024-07-16 | End: 1900-01-01

## 2024-07-16 RX ORDER — FLUOXETINE HYDROCHLORIDE 40 MG/1
CAPSULE ORAL
Refills: 0 | Status: ACTIVE | COMMUNITY

## 2024-07-16 RX ORDER — EZETIMIBE 10 MG/1
TABLET ORAL
Refills: 0 | Status: ACTIVE | COMMUNITY

## 2024-07-22 NOTE — PROCEDURE
[Gag Reflex] : gag reflex preventing mirror examination [None] : none [Flexible Endoscope] : examined with the flexible endoscope [Serial Number: ___] : Serial Number: [unfilled] [de-identified] : After patient consents, a FFL is performed.  No lesions in the NPx, OPx, HPx or larynx.  Area of drainage in the left posterior pharyngeal wall without any drainage, slight swelling but no clear collection as noted preoperatively.   VC are mobile, airway patent.

## 2024-07-22 NOTE — PROCEDURE
[Gag Reflex] : gag reflex preventing mirror examination [None] : none [Flexible Endoscope] : examined with the flexible endoscope [Serial Number: ___] : Serial Number: [unfilled] [de-identified] : After patient consents, a FFL is performed.  No lesions in the NPx, OPx, HPx or larynx.  Area of drainage in the left posterior pharyngeal wall without any drainage, slight swelling but no clear collection as noted preoperatively.   VC are mobile, airway patent.

## 2024-07-23 ENCOUNTER — APPOINTMENT (OUTPATIENT)
Dept: OTOLARYNGOLOGY | Facility: CLINIC | Age: 64
End: 2024-07-23
Payer: MEDICARE

## 2024-07-23 VITALS — BODY MASS INDEX: 22.82 KG/M2 | HEART RATE: 80 BPM | WEIGHT: 142 LBS | HEIGHT: 66 IN

## 2024-07-23 DIAGNOSIS — J39.0 RETROPHARYNGEAL AND PARAPHARYNGEAL ABSCESS: ICD-10-CM

## 2024-07-23 PROCEDURE — 99213 OFFICE O/P EST LOW 20 MIN: CPT

## 2024-07-23 NOTE — PHYSICAL EXAM
[Normal] : no rashes [Laryngoscopy Performed] : laryngoscopy was performed, see procedure section for findings [de-identified] : There is fullness along the right anterior neck which is softer, not tender any more.  No LAD. [FreeTextEntry1] : No concerning lesions in the OC/OPx on inspection or palpation.  No purulence noted.

## 2024-07-23 NOTE — DATA REVIEWED
[de-identified] : US today with possible collection vs. phlegmon in right level IV.   [de-identified] :  CT Neck independently interpreted - retropharyngeal abscess on the left.  No collections in the right anterior neck.

## 2024-07-23 NOTE — PHYSICAL EXAM
[Normal] : no rashes [Laryngoscopy Performed] : laryngoscopy was performed, see procedure section for findings [de-identified] : There is fullness along the right anterior neck which is softer, not tender any more.  No LAD. [FreeTextEntry1] : No concerning lesions in the OC/OPx on inspection or palpation.  No purulence noted.

## 2024-07-23 NOTE — DATA REVIEWED
[de-identified] : US today with possible collection vs. phlegmon in right level IV.   [de-identified] :  CT Neck independently interpreted - retropharyngeal abscess on the left.  No collections in the right anterior neck.

## 2024-07-23 NOTE — HISTORY OF PRESENT ILLNESS
[de-identified] : 64y Female with acute onset of L parapharyngeal abscess and was admitted to [facility] and s/p transoral I&D of L parapharyngeal abscess on 7/3/2024. 7/2/2024 Ct of neck Deep neck infection with 1.3 x 2.3 x 3.3 cm left parapharyngeal abscess. Mass effect on the left hypopharyngeal wall and supraglottic airway. Mild epiglottic thickening of retropharyngeal edema, likely reactive. Prominent level 2A cervical lymph nodes. Pt completed antibiotics, Protonix and Medrol dose pack with some relief.  Last week, pt came in and c.o her anterior neck still feels swollen and tender with low grade fever, there was concerned about a possible new collection but attempted aspiration with guidance in this area fails to reveal any evidence of an abscess.  Pt was put on Clindamycin and here to f/u today.  Continues on oral abx. No fevers Reports mild odynophagia, tender neck upon palpation Patient denies dysphagia, aspirations, dyspnea, dysphonia, hemoptysis or otalgia.

## 2024-07-23 NOTE — REASON FOR VISIT
[Subsequent Evaluation] : a subsequent evaluation for [FreeTextEntry2] :  L parapharyngeal abscess f/u

## 2024-07-23 NOTE — HISTORY OF PRESENT ILLNESS
[de-identified] : 64y Female with acute onset of L parapharyngeal abscess and was admitted to [facility] and s/p transoral I&D of L parapharyngeal abscess on 7/3/2024. 7/2/2024 Ct of neck Deep neck infection with 1.3 x 2.3 x 3.3 cm left parapharyngeal abscess. Mass effect on the left hypopharyngeal wall and supraglottic airway. Mild epiglottic thickening of retropharyngeal edema, likely reactive. Prominent level 2A cervical lymph nodes. Pt completed antibiotics, Protonix and Medrol dose pack with some relief.  Last week, pt came in and c.o her anterior neck still feels swollen and tender with low grade fever, there was concerned about a possible new collection but attempted aspiration with guidance in this area fails to reveal any evidence of an abscess.  Pt was put on Clindamycin and here to f/u today.  Continues on oral abx. No fevers Reports mild odynophagia, tender neck upon palpation Patient denies dysphagia, aspirations, dyspnea, dysphonia, hemoptysis or otalgia.

## 2024-08-13 ENCOUNTER — APPOINTMENT (OUTPATIENT)
Dept: OTOLARYNGOLOGY | Facility: CLINIC | Age: 64
End: 2024-08-13
Payer: MEDICARE

## 2024-08-13 VITALS
DIASTOLIC BLOOD PRESSURE: 72 MMHG | WEIGHT: 142 LBS | SYSTOLIC BLOOD PRESSURE: 111 MMHG | BODY MASS INDEX: 22.82 KG/M2 | HEART RATE: 82 BPM | HEIGHT: 66 IN

## 2024-08-13 DIAGNOSIS — J39.0 RETROPHARYNGEAL AND PARAPHARYNGEAL ABSCESS: ICD-10-CM

## 2024-08-13 PROCEDURE — 99213 OFFICE O/P EST LOW 20 MIN: CPT | Mod: 25

## 2024-08-13 PROCEDURE — 31575 DIAGNOSTIC LARYNGOSCOPY: CPT

## 2024-08-13 NOTE — PROCEDURE
[None] : none [Flexible Endoscope] : examined with the flexible endoscope [Serial Number: ___] : Serial Number: [unfilled] [de-identified] : After patient consents, a FFL is performed.  No lesions in the NPx, OPx, HPx or larynx.  VC are mobile, airway patent.  Area of previous drainage is well healed, no fullness along the posterior pharyngeal wall. [de-identified] : RPx abscess

## 2024-08-13 NOTE — PHYSICAL EXAM
[de-identified] : The fullness along the right anterior neck is significantly improved, much softer, no TTP.  No LAD. [FreeTextEntry1] : No concerning lesions in the OC/OPx on inspection or palpation.  No purulence noted.

## 2024-08-13 NOTE — HISTORY OF PRESENT ILLNESS
[de-identified] : 64y Female with acute onset of L parapharyngeal abscess and was admitted to [facility] and s/p transoral I&D of L parapharyngeal abscess on 7/3/2024. 7/2/2024 Ct of neck Deep neck infection with 1.3 x 2.3 x 3.3 cm left parapharyngeal abscess. Mass effect on the left hypopharyngeal wall and supraglottic airway. Mild epiglottic thickening of retropharyngeal edema, likely reactive. Prominent level 2A cervical lymph nodes. Presents today for follow up evaluation. States doing well. Completed abx and steroids as prescribed. Denies throat pain, dyspnea, and dysphagia. No fevers or hemoptysis.

## 2024-11-04 ENCOUNTER — APPOINTMENT (OUTPATIENT)
Dept: PULMONOLOGY | Facility: CLINIC | Age: 64
End: 2024-11-04
Payer: MEDICARE

## 2024-11-04 VITALS
HEART RATE: 73 BPM | OXYGEN SATURATION: 96 % | WEIGHT: 147 LBS | BODY MASS INDEX: 23.63 KG/M2 | HEIGHT: 66 IN | DIASTOLIC BLOOD PRESSURE: 75 MMHG | SYSTOLIC BLOOD PRESSURE: 123 MMHG

## 2024-11-04 DIAGNOSIS — F51.04 PSYCHOPHYSIOLOGIC INSOMNIA: ICD-10-CM

## 2024-11-04 DIAGNOSIS — G47.33 OBSTRUCTIVE SLEEP APNEA (ADULT) (PEDIATRIC): ICD-10-CM

## 2024-11-04 DIAGNOSIS — R06.83 SNORING: ICD-10-CM

## 2024-11-04 PROCEDURE — 99204 OFFICE O/P NEW MOD 45 MIN: CPT

## 2024-11-04 PROCEDURE — G2211 COMPLEX E/M VISIT ADD ON: CPT

## 2024-11-05 PROBLEM — G47.33 OBSTRUCTIVE SLEEP APNEA, ADULT: Status: ACTIVE | Noted: 2024-11-05

## 2024-11-07 ENCOUNTER — EMERGENCY (EMERGENCY)
Facility: HOSPITAL | Age: 64
LOS: 1 days | Discharge: HOME | End: 2024-11-07
Attending: EMERGENCY MEDICINE | Admitting: STUDENT IN AN ORGANIZED HEALTH CARE EDUCATION/TRAINING PROGRAM
Payer: MEDICARE

## 2024-11-07 VITALS
DIASTOLIC BLOOD PRESSURE: 70 MMHG | HEART RATE: 84 BPM | OXYGEN SATURATION: 98 % | RESPIRATION RATE: 18 BRPM | SYSTOLIC BLOOD PRESSURE: 117 MMHG | TEMPERATURE: 99 F

## 2024-11-07 LAB
ALBUMIN SERPL ELPH-MCNC: 4 G/DL — SIGNIFICANT CHANGE UP (ref 3.3–5)
ALP SERPL-CCNC: 76 U/L — SIGNIFICANT CHANGE UP (ref 40–120)
ALT FLD-CCNC: 17 U/L — SIGNIFICANT CHANGE UP (ref 4–33)
ANION GAP SERPL CALC-SCNC: 18 MMOL/L — HIGH (ref 7–14)
AST SERPL-CCNC: 17 U/L — SIGNIFICANT CHANGE UP (ref 4–32)
BASOPHILS # BLD AUTO: 0.03 K/UL — SIGNIFICANT CHANGE UP (ref 0–0.2)
BASOPHILS NFR BLD AUTO: 0.2 % — SIGNIFICANT CHANGE UP (ref 0–2)
BILIRUB SERPL-MCNC: 0.9 MG/DL — SIGNIFICANT CHANGE UP (ref 0.2–1.2)
BUN SERPL-MCNC: 15 MG/DL — SIGNIFICANT CHANGE UP (ref 7–23)
CALCIUM SERPL-MCNC: 8.6 MG/DL — SIGNIFICANT CHANGE UP (ref 8.4–10.5)
CHLORIDE SERPL-SCNC: 103 MMOL/L — SIGNIFICANT CHANGE UP (ref 98–107)
CO2 SERPL-SCNC: 19 MMOL/L — LOW (ref 22–31)
CREAT SERPL-MCNC: 0.79 MG/DL — SIGNIFICANT CHANGE UP (ref 0.5–1.3)
EGFR: 82 ML/MIN/1.73M2 — SIGNIFICANT CHANGE UP
EOSINOPHIL # BLD AUTO: 0 K/UL — SIGNIFICANT CHANGE UP (ref 0–0.5)
EOSINOPHIL NFR BLD AUTO: 0 % — SIGNIFICANT CHANGE UP (ref 0–6)
GLUCOSE SERPL-MCNC: 141 MG/DL — HIGH (ref 70–99)
HCT VFR BLD CALC: 41.7 % — SIGNIFICANT CHANGE UP (ref 34.5–45)
HGB BLD-MCNC: 14.5 G/DL — SIGNIFICANT CHANGE UP (ref 11.5–15.5)
IANC: 11.91 K/UL — HIGH (ref 1.8–7.4)
IMM GRANULOCYTES NFR BLD AUTO: 0.7 % — SIGNIFICANT CHANGE UP (ref 0–0.9)
LYMPHOCYTES # BLD AUTO: 0.55 K/UL — LOW (ref 1–3.3)
LYMPHOCYTES # BLD AUTO: 4.3 % — LOW (ref 13–44)
MCHC RBC-ENTMCNC: 29.8 PG — SIGNIFICANT CHANGE UP (ref 27–34)
MCHC RBC-ENTMCNC: 34.8 G/DL — SIGNIFICANT CHANGE UP (ref 32–36)
MCV RBC AUTO: 85.8 FL — SIGNIFICANT CHANGE UP (ref 80–100)
MONOCYTES # BLD AUTO: 0.28 K/UL — SIGNIFICANT CHANGE UP (ref 0–0.9)
MONOCYTES NFR BLD AUTO: 2.2 % — SIGNIFICANT CHANGE UP (ref 2–14)
NEUTROPHILS # BLD AUTO: 11.91 K/UL — HIGH (ref 1.8–7.4)
NEUTROPHILS NFR BLD AUTO: 92.6 % — HIGH (ref 43–77)
NRBC # BLD: 0 /100 WBCS — SIGNIFICANT CHANGE UP (ref 0–0)
NRBC # FLD: 0 K/UL — SIGNIFICANT CHANGE UP (ref 0–0)
PLATELET # BLD AUTO: 228 K/UL — SIGNIFICANT CHANGE UP (ref 150–400)
POTASSIUM SERPL-MCNC: 4.1 MMOL/L — SIGNIFICANT CHANGE UP (ref 3.5–5.3)
POTASSIUM SERPL-SCNC: 4.1 MMOL/L — SIGNIFICANT CHANGE UP (ref 3.5–5.3)
PROT SERPL-MCNC: 6.7 G/DL — SIGNIFICANT CHANGE UP (ref 6–8.3)
RBC # BLD: 4.86 M/UL — SIGNIFICANT CHANGE UP (ref 3.8–5.2)
RBC # FLD: 13 % — SIGNIFICANT CHANGE UP (ref 10.3–14.5)
SODIUM SERPL-SCNC: 140 MMOL/L — SIGNIFICANT CHANGE UP (ref 135–145)
WBC # BLD: 12.86 K/UL — HIGH (ref 3.8–10.5)
WBC # FLD AUTO: 12.86 K/UL — HIGH (ref 3.8–10.5)

## 2024-11-07 PROCEDURE — 70491 CT SOFT TISSUE NECK W/DYE: CPT | Mod: 26,MC

## 2024-11-07 PROCEDURE — 99223 1ST HOSP IP/OBS HIGH 75: CPT

## 2024-11-07 RX ORDER — METOCLOPRAMIDE HCL 10 MG
10 TABLET ORAL ONCE
Refills: 0 | Status: COMPLETED | OUTPATIENT
Start: 2024-11-07 | End: 2024-11-07

## 2024-11-07 RX ORDER — ONDANSETRON HYDROCHLORIDE 2 MG/ML
4 INJECTION, SOLUTION INTRAMUSCULAR; INTRAVENOUS ONCE
Refills: 0 | Status: COMPLETED | OUTPATIENT
Start: 2024-11-07 | End: 2024-11-07

## 2024-11-07 RX ORDER — AMPICILLIN AND SULBACTAM 2; 1 G/1; G/1
3 INJECTION, POWDER, FOR SOLUTION INTRAMUSCULAR; INTRAVENOUS ONCE
Refills: 0 | Status: COMPLETED | OUTPATIENT
Start: 2024-11-07 | End: 2024-11-07

## 2024-11-07 RX ORDER — SODIUM CHLORIDE 9 MG/ML
1000 INJECTION, SOLUTION INTRAMUSCULAR; INTRAVENOUS; SUBCUTANEOUS ONCE
Refills: 0 | Status: COMPLETED | OUTPATIENT
Start: 2024-11-07 | End: 2024-11-07

## 2024-11-07 RX ORDER — KETOROLAC TROMETHAMINE 30 MG/ML
15 INJECTION INTRAMUSCULAR; INTRAVENOUS ONCE
Refills: 0 | Status: DISCONTINUED | OUTPATIENT
Start: 2024-11-07 | End: 2024-11-07

## 2024-11-07 RX ORDER — DEXAMETHASONE 1.5 MG 1.5 MG/1
10 TABLET ORAL ONCE
Refills: 0 | Status: COMPLETED | OUTPATIENT
Start: 2024-11-07 | End: 2024-11-07

## 2024-11-07 RX ORDER — ACETAMINOPHEN 500 MG
1000 TABLET ORAL ONCE
Refills: 0 | Status: COMPLETED | OUTPATIENT
Start: 2024-11-07 | End: 2024-11-07

## 2024-11-07 RX ADMIN — ONDANSETRON HYDROCHLORIDE 4 MILLIGRAM(S): 2 INJECTION, SOLUTION INTRAMUSCULAR; INTRAVENOUS at 18:07

## 2024-11-07 RX ADMIN — DEXAMETHASONE 1.5 MG 102 MILLIGRAM(S): 1.5 TABLET ORAL at 18:08

## 2024-11-07 RX ADMIN — KETOROLAC TROMETHAMINE 15 MILLIGRAM(S): 30 INJECTION INTRAMUSCULAR; INTRAVENOUS at 18:07

## 2024-11-07 RX ADMIN — Medication 10 MILLIGRAM(S): at 18:07

## 2024-11-07 RX ADMIN — Medication 1000 MILLIGRAM(S): at 18:36

## 2024-11-07 RX ADMIN — AMPICILLIN AND SULBACTAM 200 GRAM(S): 2; 1 INJECTION, POWDER, FOR SOLUTION INTRAMUSCULAR; INTRAVENOUS at 18:36

## 2024-11-07 RX ADMIN — SODIUM CHLORIDE 1000 MILLILITER(S): 9 INJECTION, SOLUTION INTRAMUSCULAR; INTRAVENOUS; SUBCUTANEOUS at 19:17

## 2024-11-07 RX ADMIN — Medication 40 MILLIGRAM(S): at 18:08

## 2024-11-07 NOTE — ED PROVIDER NOTE - PROGRESS NOTE DETAILS
Silver: Mildly-elevated serum WBC. CT w/ 1.4 cm parapharyngeal abscess. Per ENT: treat medically. Observe for improvement. If worsen, then OR.

## 2024-11-07 NOTE — ED PROVIDER NOTE - PHYSICAL EXAMINATION
rash
Well-appearing, well nourished, awake, alert, oriented to person, place, time/situation and in mild pain/vomit distress.    Airway patent. Neck supple. L tonsil and paratonsillar area red/swollen. Uvula midline.    Eyes without scleral injection. No jaundice.    Strong pulse.    Respirations unlabored.    Abdomen soft, non-tender, no guarding.    MSK: Spine appears normal, range of motion is not limited, no muscle or joint tenderness.    Alert and oriented, no gross motor or sensory deficits.    Skin: normal color for race, warm, dry and intact. No evidence of rash.    No SI/HI.

## 2024-11-07 NOTE — CONSULT NOTE ADULT - SUBJECTIVE AND OBJECTIVE BOX
HPI: 64y Female w PMH DMII, HLD, endometrial cancer now in remission with prior PTA in 2020 (tx with IV abx) and recent L parapharyngeal abscess s/p I/D 7/2024 now presenting with sore throat since yesterday with feeling that her abscess has returned, sent in from ENT office Dr. Wells concerned for parapharyngeal abscess again. Reports developing fevers since yesterday and vomiting. Received unasyn x1 in ED.     On chart review, in 2020, pt had lower tonsillar abscess near pharyngoepiglottic fold with supraglottic inflammation, seen by Dr. Winters, resolved with IV abx and decadron. She re-presented 7/2/24 w sore throat x2d, CT showed 1.3 x 2.3 x 3.3 cm left parapharyngeal abscess, underwent I&D by Dr Seymour the next day with improvement. She was seen in ENT clinic with Dr. Seymour 8/13/24 and noted to be recovering well.     Afebrile in ED, WBC 12.9     CT neck:   1.4 x 1.0 x 1.3 cm L parapharyngeal abscess    Allergies    No Known Allergies    Intolerances        PAST MEDICAL & SURGICAL HISTORY:  No pertinent past medical history      No significant past surgical history          SOCIAL HISTORY:  Tobacco History:  ETOH Use:   Drug Use:     FAMILY HISTORY:      REVIEW OF SYSTEMS    General:	  As per HPI      MEDICATIONS:        Vital Signs Last 24 Hrs  T(C): 38 (07 Nov 2024 18:32), Max: 38 (07 Nov 2024 18:32)  T(F): 100.4 (07 Nov 2024 18:32), Max: 100.4 (07 Nov 2024 18:32)  HR: 89 (07 Nov 2024 18:32) (84 - 89)  BP: 108/68 (07 Nov 2024 18:32) (108/68 - 117/70)  BP(mean): --  RR: 18 (07 Nov 2024 18:32) (18 - 18)  SpO2: 97% (07 Nov 2024 18:32) (97% - 98%)    Parameters below as of 07 Nov 2024 18:32  Patient On (Oxygen Delivery Method): room air        LABS:  CBC-    11-07    140  |  103  |  15  ----------------------------<  141[H]  4.1   |  19[L]  |  0.79    Ca    8.6      07 Nov 2024 18:23    TPro  6.7  /  Alb  4.0  /  TBili  0.9  /  DBili  x   /  AST  17  /  ALT  17  /  AlkPhos  76  11-07    Coagulation Studies-    Endocrine Panel-  --  --  8.6 mg/dL        PHYSICAL EXAM:    ENT EXAM-   Constitutional: Well-developed, well-nourished.   Voice: No hoarseness.     Head:  normocephalic, atraumatic.   Ears:  External ears normal  Nose:  Septum intact.  Inferior turbinates normal bilateral  OC/OP: Tongue midline, tonsils 1+. Floor of mouth, buccal mucosa, lips, hard palate, soft palate, uvula, posterior pharyngeal wall normal.  Mucosa moist.  Neck:  Trachea midline.  Thyroid, parotid and submandibular glands normal. L cervical LAD    LARYNGOSCOPY EXAM:     -Verbal consent was obtained from patient prior to procedure.    Indication:    Anesthesia: Afrin spray was applied to the nasal cavities.    Flexible laryngoscopy was performed and revealed the following:    -- Nasopharynx had no mass or exudate.    -- Base of tongue was symmetric and not enlarged.    -- Left pharyngeal wall edema     -- Vallecula was clear    -- Epiglottis, both aryepiglottic folds and both false vocal folds were normal    -- Arytenoids both without edema and erythema     -- True vocal folds were fully mobile and without lesions.     -- Post cricoid area was clear.    -- Interarytenoid edema was absent    The patient tolerated the procedure well.      RADIOLOGY & ADDITIONAL STUDIES:      Assessment/Plan:    64y Female w PMH DMII, HLD, endometrial cancer now in remission with prior PTA in 2020 (tx with IV abx) and recent L parapharyngeal abscess s/p I/D 7/2024 now presenting with sore throat since yesterday, CT neck with 1.4 x 1.0 x 1.3 cm L parapharyngeal abscess. Scope exam with mild L pharyngeal wall edema, airway widely patent, physical exam wnl.     - c/w IV abx  - dex 10q8 x3   - NPO @MN   - ENT to re-eval in AM for improvement

## 2024-11-07 NOTE — ED PROVIDER NOTE - PRINCIPAL DIAGNOSIS
Pharmacy faxed in a request for prior authorization on:    Medication: Omeprazole  Dosage: 40 MG DR Capsules  Quantity requested:  n/a  Pharmacy for prescription has been selected.    Prior authorization request has been initiated and sent for completion.     Tonsillitis Abscess, parapharyngeal

## 2024-11-07 NOTE — ED ADULT NURSE NOTE - NSFALLUNIVINTERV_ED_ALL_ED
Bed/Stretcher in lowest position, wheels locked, appropriate side rails in place/Call bell, personal items and telephone in reach/Instruct patient to call for assistance before getting out of bed/chair/stretcher/Non-slip footwear applied when patient is off stretcher/[place] to call system/Physically safe environment - no spills, clutter or unnecessary equipment/Purposeful proactive rounding/Room/bathroom lighting operational, light cord in reach

## 2024-11-07 NOTE — ED PROVIDER NOTE - OBJECTIVE STATEMENT
Silver: H/o PTA x 2 (once treated only w/ ABx and steroids, once treated w/ ENT I & D). P/w 1-2 days sore throat, F last night, chills. Sent from [facility] Dr. Talley for ABx, steroids, CT, and ENT. Chills today. N/V. Silver: H/o parapharyngeal abscess x 2 (once treated only w/ ABx and steroids, once treated w/ ENT I & D). P/w 1-2 days sore throat, F last night, chills. Sent from [facility] Dr. Talley for ABx, steroids, CT, and ENT. Chills today. N/V.

## 2024-11-07 NOTE — ED PROVIDER NOTE - CROS ED CONS ALL NEG
Add 71510 Cpt? (Important Note: In 2017 The Use Of 70910 Is Being Tracked By Cms To Determine Future Global Period Reimbursement For Global Periods): no
Detail Level: Detailed
- - -

## 2024-11-07 NOTE — ED ADULT NURSE NOTE - OBJECTIVE STATEMENT
Pt with co pain  to left side of neck was sent in by her ENT to ro recurrent abscess, pt with fever and nausea, medicated for her symptoms. pt  with no co sob  no active vomiting fluids infusing awaiting ct scan. Pts  at bedside.

## 2024-11-07 NOTE — ED ADULT TRIAGE NOTE - CHIEF COMPLAINT QUOTE
Pt c/o sore throat, fevers x 2 days. Pt has Hx of throat abscess that required surgical drainage. Denies difficulty breathing. Tolerating secretions. ENT Dr. Seymour

## 2024-11-08 DIAGNOSIS — J39.0 RETROPHARYNGEAL AND PARAPHARYNGEAL ABSCESS: Chronic | ICD-10-CM

## 2024-11-08 LAB — ADD ON TEST-SPECIMEN IN LAB: SIGNIFICANT CHANGE UP

## 2024-11-08 PROCEDURE — 99232 SBSQ HOSP IP/OBS MODERATE 35: CPT

## 2024-11-08 RX ORDER — ASPIRIN/MAG CARB/ALUMINUM AMIN 325 MG
81 TABLET ORAL AT BEDTIME
Refills: 0 | Status: DISCONTINUED | OUTPATIENT
Start: 2024-11-08 | End: 2024-11-11

## 2024-11-08 RX ORDER — AMPICILLIN AND SULBACTAM 2; 1 G/1; G/1
3 INJECTION, POWDER, FOR SOLUTION INTRAMUSCULAR; INTRAVENOUS EVERY 6 HOURS
Refills: 0 | Status: DISCONTINUED | OUTPATIENT
Start: 2024-11-08 | End: 2024-11-11

## 2024-11-08 RX ORDER — METOPROLOL TARTRATE 50 MG
12.5 TABLET ORAL AT BEDTIME
Refills: 0 | Status: DISCONTINUED | OUTPATIENT
Start: 2024-11-08 | End: 2024-11-11

## 2024-11-08 RX ORDER — EZETIMIBE 10 MG/1
10 TABLET ORAL AT BEDTIME
Refills: 0 | Status: DISCONTINUED | OUTPATIENT
Start: 2024-11-08 | End: 2024-11-11

## 2024-11-08 RX ORDER — ZOLPIDEM TARTRATE 10 MG
5 TABLET ORAL AT BEDTIME
Refills: 0 | Status: COMPLETED | OUTPATIENT
Start: 2024-11-08 | End: 2024-11-15

## 2024-11-08 RX ORDER — ACETAMINOPHEN 500 MG
650 TABLET ORAL EVERY 6 HOURS
Refills: 0 | Status: DISCONTINUED | OUTPATIENT
Start: 2024-11-08 | End: 2024-11-08

## 2024-11-08 RX ORDER — AMPICILLIN AND SULBACTAM 2; 1 G/1; G/1
3 INJECTION, POWDER, FOR SOLUTION INTRAMUSCULAR; INTRAVENOUS ONCE
Refills: 0 | Status: COMPLETED | OUTPATIENT
Start: 2024-11-08 | End: 2024-11-08

## 2024-11-08 RX ORDER — ACETAMINOPHEN 500 MG
650 TABLET ORAL ONCE
Refills: 0 | Status: COMPLETED | OUTPATIENT
Start: 2024-11-08 | End: 2024-11-08

## 2024-11-08 RX ORDER — ACETAMINOPHEN 500 MG
1000 TABLET ORAL ONCE
Refills: 0 | Status: COMPLETED | OUTPATIENT
Start: 2024-11-08 | End: 2024-11-08

## 2024-11-08 RX ORDER — KETOROLAC TROMETHAMINE 30 MG/ML
15 INJECTION INTRAMUSCULAR; INTRAVENOUS EVERY 6 HOURS
Refills: 0 | Status: DISCONTINUED | OUTPATIENT
Start: 2024-11-08 | End: 2024-11-08

## 2024-11-08 RX ORDER — SODIUM CHLORIDE 9 MG/ML
1000 INJECTION, SOLUTION INTRAMUSCULAR; INTRAVENOUS; SUBCUTANEOUS
Refills: 0 | Status: DISCONTINUED | OUTPATIENT
Start: 2024-11-08 | End: 2024-11-08

## 2024-11-08 RX ORDER — ONDANSETRON HYDROCHLORIDE 2 MG/ML
4 INJECTION, SOLUTION INTRAMUSCULAR; INTRAVENOUS EVERY 6 HOURS
Refills: 0 | Status: DISCONTINUED | OUTPATIENT
Start: 2024-11-08 | End: 2024-11-11

## 2024-11-08 RX ORDER — DEXAMETHASONE 1.5 MG 1.5 MG/1
10 TABLET ORAL EVERY 8 HOURS
Refills: 0 | Status: COMPLETED | OUTPATIENT
Start: 2024-11-08 | End: 2024-11-08

## 2024-11-08 RX ORDER — AMPICILLIN AND SULBACTAM 2; 1 G/1; G/1
INJECTION, POWDER, FOR SOLUTION INTRAMUSCULAR; INTRAVENOUS
Refills: 0 | Status: DISCONTINUED | OUTPATIENT
Start: 2024-11-08 | End: 2024-11-11

## 2024-11-08 RX ORDER — ROSUVASTATIN CALCIUM 10 MG
20 TABLET ORAL AT BEDTIME
Refills: 0 | Status: DISCONTINUED | OUTPATIENT
Start: 2024-11-08 | End: 2024-11-11

## 2024-11-08 RX ORDER — FLUOXETINE HCL 10 MG
30 CAPSULE ORAL DAILY
Refills: 0 | Status: DISCONTINUED | OUTPATIENT
Start: 2024-11-08 | End: 2024-11-11

## 2024-11-08 RX ORDER — ZOLPIDEM TARTRATE 10 MG
5 TABLET ORAL ONCE
Refills: 0 | Status: DISCONTINUED | OUTPATIENT
Start: 2024-11-08 | End: 2024-11-08

## 2024-11-08 RX ADMIN — Medication 650 MILLIGRAM(S): at 18:40

## 2024-11-08 RX ADMIN — AMPICILLIN AND SULBACTAM 200 GRAM(S): 2; 1 INJECTION, POWDER, FOR SOLUTION INTRAMUSCULAR; INTRAVENOUS at 06:28

## 2024-11-08 RX ADMIN — AMPICILLIN AND SULBACTAM 200 GRAM(S): 2; 1 INJECTION, POWDER, FOR SOLUTION INTRAMUSCULAR; INTRAVENOUS at 12:06

## 2024-11-08 RX ADMIN — DEXAMETHASONE 1.5 MG 102 MILLIGRAM(S): 1.5 TABLET ORAL at 09:40

## 2024-11-08 RX ADMIN — KETOROLAC TROMETHAMINE 15 MILLIGRAM(S): 30 INJECTION INTRAMUSCULAR; INTRAVENOUS at 19:30

## 2024-11-08 RX ADMIN — Medication 12.5 MILLIGRAM(S): at 21:50

## 2024-11-08 RX ADMIN — Medication 5 MILLIGRAM(S): at 02:10

## 2024-11-08 RX ADMIN — Medication 650 MILLIGRAM(S): at 18:09

## 2024-11-08 RX ADMIN — AMPICILLIN AND SULBACTAM 200 GRAM(S): 2; 1 INJECTION, POWDER, FOR SOLUTION INTRAMUSCULAR; INTRAVENOUS at 18:10

## 2024-11-08 RX ADMIN — KETOROLAC TROMETHAMINE 15 MILLIGRAM(S): 30 INJECTION INTRAMUSCULAR; INTRAVENOUS at 12:06

## 2024-11-08 RX ADMIN — Medication 5 MILLIGRAM(S): at 21:49

## 2024-11-08 RX ADMIN — EZETIMIBE 10 MILLIGRAM(S): 10 TABLET ORAL at 21:50

## 2024-11-08 RX ADMIN — Medication 20 MILLIGRAM(S): at 21:49

## 2024-11-08 RX ADMIN — AMPICILLIN AND SULBACTAM 200 GRAM(S): 2; 1 INJECTION, POWDER, FOR SOLUTION INTRAMUSCULAR; INTRAVENOUS at 01:44

## 2024-11-08 RX ADMIN — Medication 81 MILLIGRAM(S): at 21:49

## 2024-11-08 RX ADMIN — SODIUM CHLORIDE 125 MILLILITER(S): 9 INJECTION, SOLUTION INTRAMUSCULAR; INTRAVENOUS; SUBCUTANEOUS at 02:10

## 2024-11-08 RX ADMIN — DEXAMETHASONE 1.5 MG 102 MILLIGRAM(S): 1.5 TABLET ORAL at 02:11

## 2024-11-08 RX ADMIN — Medication 30 MILLIGRAM(S): at 12:46

## 2024-11-08 RX ADMIN — Medication 400 MILLIGRAM(S): at 08:54

## 2024-11-08 RX ADMIN — KETOROLAC TROMETHAMINE 15 MILLIGRAM(S): 30 INJECTION INTRAMUSCULAR; INTRAVENOUS at 18:55

## 2024-11-08 RX ADMIN — KETOROLAC TROMETHAMINE 15 MILLIGRAM(S): 30 INJECTION INTRAMUSCULAR; INTRAVENOUS at 13:00

## 2024-11-08 RX ADMIN — KETOROLAC TROMETHAMINE 15 MILLIGRAM(S): 30 INJECTION INTRAMUSCULAR; INTRAVENOUS at 06:38

## 2024-11-08 NOTE — CHART NOTE - NSCHARTNOTEFT_GEN_A_CORE
Please avoid additional steroids today. Please make NPO at midnight as ENT will re-evaluate in the morning to assess for any need for possible surgical intervention. Please avoid additional steroids today. Please make NPO at midnight as ENT will re-evaluate in the morning to assess for any need for possible surgical intervention. Will also follow up AM CBC.

## 2024-11-08 NOTE — PROGRESS NOTE ADULT - SUBJECTIVE AND OBJECTIVE BOX
OTOLARYNGOLOGY (ENT) PROGRESS NOTE    PATIENT: JANIE ROD  MRN: [id number]  : 56  [id number]  DATE OF SERVICE:  24  			           Subjective/ Interval: Patient seen and examined at bedside. AFVSS. She reports pain significantly improved. No longer has pain with swallowing. Tolerating her secretions. No changes in voice. No difficulty breathing.     ALLERGIES:  No Known Allergies      MEDICATIONS:  Antiinfectives:   ampicillin/sulbactam  IVPB      ampicillin/sulbactam  IVPB 3 Gram(s) IV Intermittent every 6 hours    IV fluids:    Hematologic/Anticoagulation:  aspirin enteric coated 81 milliGRAM(s) Oral at bedtime    Pain medications/Neuro:  FLUoxetine 30 milliGRAM(s) Oral daily  ketorolac   Injectable 15 milliGRAM(s) IV Push every 6 hours PRN  ondansetron Injectable 4 milliGRAM(s) IV Push every 6 hours PRN  zolpidem 5 milliGRAM(s) Oral at bedtime    Endocrine Medications:   ezetimibe 10 milliGRAM(s) Oral at bedtime  rosuvastatin 20 milliGRAM(s) Oral at bedtime    All other standing medications:   metoprolol succinate ER 12.5 milliGRAM(s) Oral at bedtime    All other PRN medications:    Vital Signs Last 24 Hrs  T(C): 36.8 (2024 10:30), Max: 38 (2024 18:32)  T(F): 98.3 (2024 10:30), Max: 100.4 (2024 18:32)  HR: 61 (2024 10:30) (60 - 89)  BP: 108/63 (2024 10:30) (99/78 - 117/70)  BP(mean): 82 (2024 02:10) (73 - 82)  RR: 16 (2024 10:30) (16 - 18)  SpO2: 97% (2024 10:30) (96% - 98%)    Parameters below as of 2024 10:30  Patient On (Oxygen Delivery Method): room air              Constitutional: Well-developed, well-nourished.   Voice: No hoarseness.     Head:  normocephalic, atraumatic.   Ears:  External ears normal  Nose:  Septum intact.  Inferior turbinates normal bilateral  OC/OP: Tongue midline, tonsils 1+. Floor of mouth, buccal mucosa, lips, hard palate, soft palate, uvula, posterior pharyngeal wall normal.  Mucosa moist.  Neck:  Trachea midline.  Thyroid, parotid and submandibular glands normal. L cervical LAD    LARYNGOSCOPY EXAM:     -Verbal consent was obtained from patient prior to procedure.    Indication:    Anesthesia: Afrin spray was applied to the nasal cavities.    Flexible laryngoscopy was performed and revealed the following:    -- Nasopharynx had no mass or exudate.    -- Base of tongue was symmetric and not enlarged.    -- Left pharyngeal wall edema     -- Vallecula was clear    -- Epiglottis, both aryepiglottic folds and both false vocal folds were normal    -- Arytenoids both without edema and erythema     -- True vocal folds were fully mobile and without lesions.     -- Post cricoid area was clear.    -- Interarytenoid edema was absent    The patient tolerated the procedure well.            LABS                       14.5   12.86 )-----------( 228      ( 2024 18:23 )             41.7        140  |  103  |  15  ----------------------------<  141[H]  4.1   |  19[L]  |  0.79    Ca    8.6      2024 18:23    TPro  6.7  /  Alb  4.0  /  TBili  0.9  /  DBili  x   /  AST  17  /  ALT  17  /  AlkPhos  76           Coagulation Studies-     Urinalysis Basic - ( 2024 18:23 )    Color: x / Appearance: x / SG: x / pH: x  Gluc: 141 mg/dL / Ketone: x  / Bili: x / Urobili: x   Blood: x / Protein: x / Nitrite: x   Leuk Esterase: x / RBC: x / WBC x   Sq Epi: x / Non Sq Epi: x / Bacteria: x      Endocrine Panel-  Calcium: 8.6 mg/dL ( @ 18:23)                MICROBIOLOGY:        RADIOLOGY & ADDITIONAL STUDIES:    Assessment and Plan:  JANIE ROD is a  64yFemale w PMH DMII, HLD, endometrial cancer now in remission with prior PTA in  (tx with IV abx) and recent L parapharyngeal abscess s/p I/D 2024 now presenting with sore throat since yesterday, CT neck with 1.4 x 1.0 x 1.3 cm L parapharyngeal abscess. Scope exam with mild L pharyngeal wall edema, airway widely patent, physical exam wnl.     - c/w IV abx  - dex 10q8 x3   - okay to advance diet today as tolerated    Page ENT with any questions/concerns.  Peds Page #[id number]  Adult Page #[id number]    Mireya Amaro  24 @ 13:28

## 2024-11-08 NOTE — ED ADULT NURSE REASSESSMENT NOTE - NS ED NURSE REASSESS COMMENT FT1
pt remains at baseline mental status, RR even unlabored completing full sentences. pt resting in stretcher comfortably at this time, no new complaints offered. abx running as ordered. stretcher lowest position siderails up safety measures in place. pt remains at baseline mental status, RR even unlabored completing full sentences. pt endorsing increasing throat pain, pt medicated with PRN toradol 15mg IVP. abx running as ordered. maintenace fluids remain running @ NS 125mL/hr. stretcher lowest position siderails up safety measures in place. pt virtual cdu patient, pending reassessment in the AM.

## 2024-11-08 NOTE — ED CDU PROVIDER SUBSEQUENT DAY NOTE - NSICDXPASTMEDICALHX_GEN_ALL_CORE_FT
PAST MEDICAL HISTORY:  DM (diabetes mellitus)     Endometrial cancer     History of peritonsillar abscess     HLD (hyperlipidemia)     Parapharyngeal abscess      PAST MEDICAL HISTORY:  Endometrial cancer     History of peritonsillar abscess     HLD (hyperlipidemia)     Parapharyngeal abscess     Prediabetes

## 2024-11-08 NOTE — ED CDU PROVIDER INITIAL DAY NOTE - PHYSICAL EXAMINATION
Well-appearing, well nourished, awake, alert, oriented to person, place, time/situation and in no apparent distress.    Airway patent. Neck supple. L tonsillar area erythematous. Uvula midline.     Eyes without scleral injection. No jaundice.    Strong pulse.    Respirations unlabored.    Abdomen soft, non-tender, no guarding.    MSK: Spine appears normal, range of motion is not limited, no muscle or joint tenderness.    Alert and oriented, no gross motor or sensory deficits.    Skin: normal color for race, warm, dry and intact. No evidence of rash.    No SI/HI.

## 2024-11-08 NOTE — ED ADULT NURSE REASSESSMENT NOTE - NS ED NURSE REASSESS COMMENT FT1
received pt, resting comfortably in stretcher. aao x 4, speaking in complete sentences, endorsing mild throat pain. able to tolerate po. awaiting surgery team/consult. Will cont to monitor.

## 2024-11-08 NOTE — ED CDU PROVIDER SUBSEQUENT DAY NOTE - PHYSICAL EXAMINATION
CONSTITUTIONAL:  Well appearing, awake, alert, oriented to person, place, time/situation and in no apparent distress.  Pt. is objectively comfortable appearing and verbalizing in full, clear, effortless sentences.  ENMT: NC/AT.  Airway patent.  Nasal mucosa clear.  Moist mucous membranes.  Left tonsillar hyperemia.  Neck supple.  EYES:  Clear OU.  CARDIAC:  Normal rate, regular rhythm.  Heart sounds S1 S2.  No murmurs, gallops, or rubs.  RESPIRATORY:  Breath sounds clear and equal bilaterally.  No wheezes, no rales, no rhonchi.  GASTROINTESTINAL:  Abdomen soft, non-distended, non-tender.  No rebound, no guarding.  NEUROLOGICAL:  Alert and oriented to person/place/time/situation.  No focal deficits; no tremors noted.   MUSCULOSKELETAL:  Range of motion is not limited.     SKIN:  Skin color unremarkable.  Skin warm, dry.   PSYCHIATRIC:  Alert and oriented to person/place/time/situation.  Mood and affect WNL.  No apparent risk to self or others. CONSTITUTIONAL:  Well appearing, awake, alert, oriented to person, place, time/situation and in no apparent distress.  Pt. is objectively comfortable appearing and verbalizing in full, clear, effortless sentences.  ENMT: NC/AT.  Airway patent.  Nasal mucosa clear.  Moist mucous membranes.  Minimal bilateral tonsil hyperemia noted; no tonsillar enlargement nor exudates appreciated; uvula is midline and without edema.  Neck supple.  EYES:  Clear OU.  CARDIAC:  Normal rate, regular rhythm.  Heart sounds S1 S2.  No murmurs, gallops, or rubs.  RESPIRATORY:  Breath sounds clear and equal bilaterally.  No wheezes, no rales, no rhonchi.  GASTROINTESTINAL:  Abdomen soft, non-distended, non-tender.  No rebound, no guarding.  NEUROLOGICAL:  Alert and oriented to person/place/time/situation.  No focal deficits; no tremors noted.   MUSCULOSKELETAL:  Range of motion is not limited.     SKIN:  Skin color unremarkable.  Skin warm, dry.   PSYCHIATRIC:  Alert and oriented to person/place/time/situation.  Mood and affect WNL.  No apparent risk to self or others.

## 2024-11-08 NOTE — ED CDU PROVIDER INITIAL DAY NOTE - OBJECTIVE STATEMENT
Silver: H/o parapharyngeal abscess x 2 (once treated only w/ ABx and steroids, once treated w/ ENT I & D). P/w 1-2 days sore throat, F last night, chills. Sent from [facility] Dr. Talley for ABx, steroids, CT, and ENT. Chills today. N/V. In ED: Mildly-elevated serum WBC. CT w/ 1.4 cm parapharyngeal abscess. Per ENT after they scoped her: treat medically. Observe for improvement. If worsen, then OR.

## 2024-11-08 NOTE — ED CDU PROVIDER INITIAL DAY NOTE - ATTENDING APP SHARED VISIT CONTRIBUTION OF CARE
I performed a face-to-face evaluation of the patient and performed a history and physical examination. I agree with the history and physical examination. If this was a PA visit, I personally saw the patient with the PA and performed a substantive portion of the visit including all aspects of the medical decision making.    Silver: Parapharyngeal abscess. CDU for Unasyn and Dexamethasone and to be seen again by ENT (in AM).

## 2024-11-08 NOTE — ED CDU PROVIDER INITIAL DAY NOTE - CLINICAL SUMMARY MEDICAL DECISION MAKING FREE TEXT BOX
Silver: Parapharyngeal abscess. CDU for Unasyn and Dexamethasone and to be seen again by ENT (in AM).

## 2024-11-08 NOTE — ED CDU PROVIDER SUBSEQUENT DAY NOTE - PROGRESS NOTE DETAILS
Patient was evaluated by ENT team at this time.   ENT recommends regular diet.  Await further recommendations. This patient was signed back out to me by MARION Garcia at 1900 hrs.  In the interim, pt objectively noted to be resting comfortably; pt has been clinically stable.  Per ENT recommendations, advised continued observation with pt to resume NPO after midnight status.  Pt is requesting that Q4H fingersticks be discontinued; she clarified that she does not have a hx/o DM, rather has been told she is prediabetic.  Current A1c 5.7.  FS glucose levels have been uptrending, likely 2/2 steroids given; ENT stated no further steroids advised at this time as pt completed the Decadron x 3 doses regimen they had previously advised.  ENT will continue to monitor pt status; pt currently with no c/o, pt stable at this time, objectively noted to be resting comfortably.

## 2024-11-08 NOTE — ED CDU PROVIDER SUBSEQUENT DAY NOTE - HISTORY
65 yo female, PMH DM2, HLD, endometrial cancer (reportedly in remission), hx/o prior PTA in 2020, and recent parapharyngeal abscess s/p I&D 7/2024; pt presented to this ED after being directed by outpatient ENT Dr. Wells for concerns of recurrent parapharyngeal abscess.  Pt admits to fevers since 11/6/24, nausea, vomiting, and sore throat.  In the ED, VSS, Tmax 100.4.  WBC 12.86; CMP: bicarb 19, anion gap 18, glucose 141.  CT neck soft tissue w/ IV contrast was performed; per official radiology report: "...IMPRESSION: Left parapharyngeal space abscess, 1.4 cm in greatest dimension, no significant mass effect on the airway. The left facial artery courses either through or immediately adjacent to the left posterior corner of the abscess.".  ENT was consulted; pt was scoped with left pharyngeal wall edema reported; ENT advised to continue with IV antibiotics (Unasyn was given in the ED), Decadron 10 Q8H x 3 doses, and NPO after midnight; ENT team is following.  Pt was sent to CDU for continued care.  In the interim, pt objectively noted to be resting comfortably; pt has been clinically stable; no issues thus far. 65 yo female, PMH pre-diabetes (pt denies hx/o DM), HLD, endometrial cancer (reportedly in remission), hx/o prior PTA in 2020, and recent parapharyngeal abscess s/p I&D 7/2024; pt presented to this ED after being directed by outpatient ENT Dr. Wells for concerns of recurrent parapharyngeal abscess.  Pt admits to fevers since 11/6/24, nausea, vomiting, and sore throat.  In the ED, VSS, Tmax 100.4.  WBC 12.86; CMP: bicarb 19, anion gap 18, glucose 141.  CT neck soft tissue w/ IV contrast was performed; per official radiology report: "...IMPRESSION: Left parapharyngeal space abscess, 1.4 cm in greatest dimension, no significant mass effect on the airway. The left facial artery courses either through or immediately adjacent to the left posterior corner of the abscess.".  ENT was consulted; pt was scoped with left pharyngeal wall edema reported; ENT advised to continue with IV antibiotics (Unasyn was given in the ED), Decadron 10 Q8H x 3 doses, and NPO after midnight; ENT team is following.  Pt was sent to CDU for continued care.  In the interim, pt objectively noted to be resting comfortably; pt has been clinically stable; no issues thus far.

## 2024-11-09 VITALS
OXYGEN SATURATION: 96 % | HEART RATE: 55 BPM | RESPIRATION RATE: 16 BRPM | SYSTOLIC BLOOD PRESSURE: 112 MMHG | DIASTOLIC BLOOD PRESSURE: 62 MMHG | TEMPERATURE: 98 F

## 2024-11-09 PROCEDURE — 99239 HOSP IP/OBS DSCHRG MGMT >30: CPT

## 2024-11-09 RX ORDER — AMOXICILLIN AND CLAVULANATE POTASSIUM 600; 42.9 MG/5ML; MG/5ML
875 POWDER, FOR SUSPENSION ORAL
Qty: 14 | Refills: 0
Start: 2024-11-09 | End: 2024-11-15

## 2024-11-09 RX ORDER — ACETAMINOPHEN 500 MG
1000 TABLET ORAL ONCE
Refills: 0 | Status: COMPLETED | OUTPATIENT
Start: 2024-11-09 | End: 2024-11-09

## 2024-11-09 RX ADMIN — Medication 400 MILLIGRAM(S): at 09:04

## 2024-11-09 RX ADMIN — AMPICILLIN AND SULBACTAM 200 GRAM(S): 2; 1 INJECTION, POWDER, FOR SOLUTION INTRAMUSCULAR; INTRAVENOUS at 06:37

## 2024-11-09 RX ADMIN — AMPICILLIN AND SULBACTAM 200 GRAM(S): 2; 1 INJECTION, POWDER, FOR SOLUTION INTRAMUSCULAR; INTRAVENOUS at 00:22

## 2024-11-09 NOTE — ED CDU PROVIDER DISPOSITION NOTE - NSFOLLOWUPINSTRUCTIONS_ED_ALL_ED_FT
Advance activity as tolerated.  Continue all previously prescribed medications as directed unless otherwise instructed.  Follow up with your primary care physician and ENT (referral list provided or you may call the clinic to make an appointment [phone] )  in 48-72 hours- bring copies of your results.  Return to the ER for worsening or persistent symptoms, including but not limited to worsening/persistent pain, fevers, difficulty swallowing, difficulty breathing, neck stiffness, passing out, choking, chest pain, shortness of breath, change in your voice, and/or ANY NEW OR CONCERNING SYMPTOMS. If you have issues obtaining follow up, please call: [phone] to obtain a doctor or specialist who takes your insurance in your area.  You may call [phone] to make an appointment with the internal medicine clinic. Advance activity as tolerated.  Continue all previously prescribed medications as directed unless otherwise instructed.  Take Augmentin, one pill, every 12 hours for 7 days.  Take Motrin (also sold as Advil or Ibuprofen) 400-600 mg (two or three 200 mg over the counter pills) every 8 hours as needed for moderate pain-- take with food; do not take for more than 5 consecutive days.  Take Tylenol 650mg (Two 325 mg pills) every 4-6 hours as needed for pain.  Follow up with your primary care physician and ENT (referral list provided or you may call the clinic to make an appointment [phone] )  in 48-72 hours- bring copies of your results.  Return to the ER for worsening or persistent symptoms, including but not limited to worsening/persistent pain, fevers, difficulty swallowing, difficulty breathing, neck stiffness, passing out, choking, chest pain, shortness of breath, change in your voice, and/or ANY NEW OR CONCERNING SYMPTOMS. If you have issues obtaining follow up, please call: [phone] to obtain a doctor or specialist who takes your insurance in your area.  You may call [phone] to make an appointment with the internal medicine clinic.

## 2024-11-09 NOTE — ED CDU PROVIDER SUBSEQUENT DAY NOTE - HISTORY
65 yo female, PMH pre-diabetes (pt denies hx/o DM), HLD, endometrial cancer (reportedly in remission), hx/o prior PTA in 2020, and recent parapharyngeal abscess s/p I&D 7/2024; pt presented to this ED on 11/7/24 after being directed by outpatient ENT Dr. Wells for concerns of recurrent parapharyngeal abscess.  Pt admitted to fevers since 11/6/24, nausea, vomiting, and sore throat.  In the ED, VSS, Tmax 100.4.  WBC 12.86; CMP: bicarb 19, anion gap 18, glucose 141.  CT neck soft tissue w/ IV contrast was performed; per official radiology report: "...IMPRESSION: Left parapharyngeal space abscess, 1.4 cm in greatest dimension, no significant mass effect on the airway. The left facial artery courses either through or immediately adjacent to the left posterior corner of the abscess.".  ENT was consulted; pt was scoped with left pharyngeal wall edema reported; ENT advised to continue with IV antibiotics (Unasyn was given in the ED), Decadron 10 Q8H x 3 doses, and NPO after midnight; ENT team is following.  Pt was sent to CDU for continued care.  In CDU, pt reassessed periodically by ENT; advised to stay for additional 24 hours for continued care/tx/monitoring.  In the interim, pt objectively noted to be resting comfortably; pt has been clinically stable; no issues thus far.

## 2024-11-09 NOTE — ED CDU PROVIDER DISPOSITION NOTE - PATIENT PORTAL LINK FT
You can access the FollowMyHealth Patient Portal offered by [facility] by registering at the following website: [email or web address]. By joining [employer]’s FollowMyHealth portal, you will also be able to view your health information using other applications (apps) compatible with our system.

## 2024-11-09 NOTE — PROGRESS NOTE ADULT - SUBJECTIVE AND OBJECTIVE BOX
OTOLARYNGOLOGY (ENT) PROGRESS NOTE    PATIENT: JANIE ROD  MRN: [id number]  : 56  [id number]  DATE OF SERVICE:  24  			           Subjective/ Interval: Patient seen and examined at bedside. AFVSS. She endorses major improvements in her swallowing and pain off sterids for 24 hours.  Her range of motion is intact in all directions. She denies difficulty breathing and no voice changes. She ha    ALLERGIES:  No Known Allergies      MEDICATIONS:  Antiinfectives:   ampicillin/sulbactam  IVPB      ampicillin/sulbactam  IVPB 3 Gram(s) IV Intermittent every 6 hours    IV fluids:    Hematologic/Anticoagulation:  aspirin enteric coated 81 milliGRAM(s) Oral at bedtime    Pain medications/Neuro:  FLUoxetine 30 milliGRAM(s) Oral daily  ketorolac   Injectable 15 milliGRAM(s) IV Push every 6 hours PRN  ondansetron Injectable 4 milliGRAM(s) IV Push every 6 hours PRN  zolpidem 5 milliGRAM(s) Oral at bedtime    Endocrine Medications:   ezetimibe 10 milliGRAM(s) Oral at bedtime  rosuvastatin 20 milliGRAM(s) Oral at bedtime    All other standing medications:   metoprolol succinate ER 12.5 milliGRAM(s) Oral at bedtime    All other PRN medications:    ICU Vital Signs Last 24 Hrs  T(C): 36.7 (2024 09:42), Max: 36.9 (2024 06:00)  T(F): 98.1 (2024 09:42), Max: 98.4 (2024 06:00)  HR: 55 (2024 09:42) (55 - 66)  BP: 112/62 (2024 09:42) (98/59 - 117/60)  RR: 16 (2024 09:42) (16 - 17)  SpO2: 96% (2024 09:42) (95% - 97%)    O2 Parameters below as of 2024 09:42  Patient On (Oxygen Delivery Method): room air          Constitutional: Well-developed, well-nourished.   Voice: No hoarseness.     Head:  normocephalic, atraumatic.   Ears:  External ears normal  Nose:  Septum intact.  Inferior turbinates normal bilateral  OC/OP: Tongue midline, tonsils 1+. Floor of mouth, buccal mucosa, lips, hard palate, soft palate, uvula, posterior pharyngeal wall normal.  Mucosa moist.  Neck:  Trachea midline.  Thyroid, parotid and submandibular glands normal. L cervical LAD. Range of motion intact in all directions              RADIOLOGY & ADDITIONAL STUDIES:    Assessment and Plan:  JANIE ROD is a  64yFemale w PMH DMII, HLD, endometrial cancer now in remission with prior PTA in  (tx with IV abx) and recent L parapharyngeal abscess s/p I/D 2024 now presenting with sore throat since yesterday, CT neck with 1.4 x 1.0 x 1.3 cm L parapharyngeal abscess. Scope exam with mild L pharyngeal wall edema, airway widely patent, physical exam wnl. Patient doing very well today      - Discharge with augmentin to finish a 10 day course.  - okay to advance diet today as tolerated  -OP follow up with ENT    Page ENT with any questions/concerns.  Peds Page #[id number]  Adult Page #[id number]

## 2024-11-09 NOTE — ED CDU PROVIDER SUBSEQUENT DAY NOTE - NSICDXPASTMEDICALHX_GEN_ALL_CORE_FT
PAST MEDICAL HISTORY:  Endometrial cancer     History of peritonsillar abscess     HLD (hyperlipidemia)     Parapharyngeal abscess     Prediabetes

## 2024-11-09 NOTE — ED CDU PROVIDER SUBSEQUENT DAY NOTE - ATTENDING APP SHARED VISIT CONTRIBUTION OF CARE
I personally made the management plan, and take responsibility for the patient's management. I have discussed with and reviewed the PA's note and agree with the History, ROS, Physical Exam and MDM unless otherwise indicated. A brief summary of my personal evaluation and impression can be found below.    pt seen in CDU bed Sara: 65 yo F w/ pre-dm, HLD, endometrial CA, PTA in 2020, prior parapharyngeal abscess prior, sent in from ENT office CT showing same, ENT recommended IV abx, decadron q8 x10, on UNASYN, FSG uptrending on steroids, pt deferring fingersticks    Plan for today is ENT reassessment for possible DC. pt feeling better, would like to go home if possible.   has been NPO for possible procedure, she is hoping to eat.     On exam:   General: Well-appearing, NAD  Head: Atraumatic, normocephalic  Eyes: EOM grossly in tact, no scleral icterus  ENT: moist mucous membranes  Neurology: A&Ox 3   Respiratory: normal respiratory effort, CTAB, no wheezing or stridor  CV: Extremities warm and well perfused, RRR  Abdominal: Soft, non-distended  Extremities: No edema  Skin: No rashes    will continue to monitor, awaiting ent recs for dc plan. likely dc w/ abx.
Seen and examined, have discussed plan of care with PA.   I agree with note as stated above, having amended the EMR as needed to reflect the findings.

## 2024-11-09 NOTE — ED CDU PROVIDER DISPOSITION NOTE - ATTENDING APP SHARED VISIT CONTRIBUTION OF CARE
I personally made the management plan, and take responsibility for the patient's management. I have discussed with and reviewed the PA's note and agree with the History, ROS, Physical Exam and MDM unless otherwise indicated. A brief summary of my personal evaluation and impression can be found below.    pt seen in CDU bed 01-Quarles: 65 yo F w/ pre-dm, HLD, endometrial CA, PTA in 2020, prior parapharyngeal abscess prior, sent in from ENT office CT showing same, ENT recommended IV abx, decadron q8 x10, on UNASYN, FSG uptrending on steroids, pt deferring fingersticks    Plan for today is ENT reassessment for possible DC. pt feeling better, would like to go home if possible.   has been NPO for possible procedure, she is hoping to eat.     On exam:   General: Well-appearing, NAD  Head: Atraumatic, normocephalic  Eyes: EOM grossly in tact, no scleral icterus  ENT: moist mucous membranes  Neurology: A&Ox 3   Respiratory: normal respiratory effort, CTAB, no wheezing or stridor  CV: Extremities warm and well perfused, RRR  Abdominal: Soft, non-distended  Extremities: No edema  Skin: No rashes    seen by ent, cleared for dc on abx.

## 2024-11-09 NOTE — ED CDU PROVIDER DISPOSITION NOTE - CARE PROVIDER_API CALL
Dawn Dev  Otolaryngology  [address] [place] [id number]  Phone: [phone]  Fax: [phone]  Follow Up Time:

## 2024-11-09 NOTE — ED CDU PROVIDER SUBSEQUENT DAY NOTE - NSICDXPASTSURGICALHX_GEN_ALL_CORE_FT
PAST SURGICAL HISTORY:  Parapharyngeal abscess     
PAST SURGICAL HISTORY:  Parapharyngeal abscess

## 2024-11-09 NOTE — ED CDU PROVIDER DISPOSITION NOTE - CLINICAL COURSE
Patient is a 64-year-old female with past medical history of prediabetes, hyperlipidemia, endometrial cancer, history of parapharyngeal abscess presents with throat pain.  In the emergency department, patient had CT neck soft tissue with IV contrast with following impression: "Left parapharyngeal space abscess, 1.4 cm in greatest dimension, no significant mass effect on the airway. The left facial artery courses either through or immediately adjacent to the left posterior corner of the abscess."  Patient was placed in the observation unit for Unasyn and 3 doses of Decadron.  Otolaryngology recommends–––––. Patient is a 64-year-old female with past medical history of prediabetes, hyperlipidemia, endometrial cancer, history of parapharyngeal abscess presents with throat pain.  In the emergency department, patient had CT neck soft tissue with IV contrast with following impression: "Left parapharyngeal space abscess, 1.4 cm in greatest dimension, no significant mass effect on the airway. The left facial artery courses either through or immediately adjacent to the left posterior corner of the abscess."  Patient was placed in the observation unit for Unasyn and 3 doses of Decadron.  Otolaryngology recommends discharge on augmentin x 1 week and follow up with Dr. Seymour.  At the time of disposition, patient tolerating PO.

## 2024-11-09 NOTE — ED CDU PROVIDER SUBSEQUENT DAY NOTE - NS ED ATTENDING STATEMENT MOD
This was a shared visit with the AMBERLY. I reviewed and verified the documentation.
This was a shared visit with the AMBERLY. I reviewed and verified the documentation.

## 2024-11-09 NOTE — ED CDU PROVIDER SUBSEQUENT DAY NOTE - PHYSICAL EXAMINATION
CONSTITUTIONAL:  Well appearing, awake, alert, oriented to person, place, time/situation and in no apparent distress.  Pt. is objectively comfortable appearing and verbalizing in full, clear, effortless sentences.  ENMT: NC/AT.  Airway patent.  Nasal mucosa clear.  Moist mucous membranes.  Minimal bilateral tonsil hyperemia noted; no tonsillar enlargement nor exudates appreciated; uvula is midline and without edema.  Neck supple.  EYES:  Clear OU.  CARDIAC:  Normal rate, regular rhythm.  Heart sounds S1 S2.  No murmurs, gallops, or rubs.  RESPIRATORY:  Breath sounds clear and equal bilaterally.  No wheezes, no rales, no rhonchi.  GASTROINTESTINAL:  Abdomen soft, non-distended, non-tender.  No rebound, no guarding.  NEUROLOGICAL:  Alert and oriented to person/place/time/situation.  No focal deficits; no tremors noted.   MUSCULOSKELETAL:  Range of motion is not limited.     SKIN:  Skin color unremarkable.  Skin warm, dry.   PSYCHIATRIC:  Alert and oriented to person/place/time/situation.  Mood and affect WNL.  No apparent risk to self or others.

## 2024-11-11 ENCOUNTER — APPOINTMENT (OUTPATIENT)
Dept: OTOLARYNGOLOGY | Facility: CLINIC | Age: 64
End: 2024-11-11

## 2024-11-11 PROBLEM — R73.03 PREDIABETES: Chronic | Status: ACTIVE | Noted: 2024-11-08

## 2024-11-11 PROBLEM — Z87.09 PERSONAL HISTORY OF OTHER DISEASES OF THE RESPIRATORY SYSTEM: Chronic | Status: ACTIVE | Noted: 2024-11-08

## 2024-11-11 PROBLEM — C54.1 MALIGNANT NEOPLASM OF ENDOMETRIUM: Chronic | Status: ACTIVE | Noted: 2024-11-08

## 2024-11-11 PROBLEM — J39.0 RETROPHARYNGEAL AND PARAPHARYNGEAL ABSCESS: Chronic | Status: ACTIVE | Noted: 2024-11-08

## 2024-11-11 PROBLEM — E78.5 HYPERLIPIDEMIA, UNSPECIFIED: Chronic | Status: ACTIVE | Noted: 2024-11-08

## 2024-11-11 PROCEDURE — 99214 OFFICE O/P EST MOD 30 MIN: CPT | Mod: 25

## 2024-11-11 PROCEDURE — 31575 DIAGNOSTIC LARYNGOSCOPY: CPT

## 2024-11-19 ENCOUNTER — APPOINTMENT (OUTPATIENT)
Dept: OTOLARYNGOLOGY | Facility: CLINIC | Age: 64
End: 2024-11-19
Payer: MEDICARE

## 2024-11-19 VITALS
OXYGEN SATURATION: 97 % | HEART RATE: 83 BPM | DIASTOLIC BLOOD PRESSURE: 84 MMHG | SYSTOLIC BLOOD PRESSURE: 128 MMHG | HEIGHT: 66 IN | WEIGHT: 147 LBS | BODY MASS INDEX: 23.63 KG/M2

## 2024-11-19 DIAGNOSIS — J39.0 RETROPHARYNGEAL AND PARAPHARYNGEAL ABSCESS: ICD-10-CM

## 2024-11-19 PROCEDURE — 99214 OFFICE O/P EST MOD 30 MIN: CPT | Mod: 25

## 2024-11-19 PROCEDURE — 31575 DIAGNOSTIC LARYNGOSCOPY: CPT

## 2024-11-22 NOTE — ED PROVIDER NOTE - CHIEF COMPLAINT
Detail Level: Detailed Plan: Presumed biopsy site for outside pathology report, DOS 10/8/24, Site A \"Left Mid Posterior Forearm\" \\nInvasive Well Differentiated SCC\\nPhoto obtained today\\nSchedule excision with closure once site is positively identified by [facility]ermatologist. \\nPatient has identified a biopsy site on Left Ventral Forearm that she believes  was site of biopsy.  Photo of this site obtained and labeled as patient identified biopsy site ? Plan: Presumed biopsy site for outside pathology report, DOS 10/8/24, Site B \"Left Mid Anterior Lower Leg\" \\nInvasive Well Differentiated SCC\\nPhoto obtained today\\nSchedule Mohs once site is positively identified by [facility] Dermatologist. \\n\\nPatient agrees with site identification The patient is a 64y Female complaining of throat, pain.

## 2025-01-23 ENCOUNTER — APPOINTMENT (OUTPATIENT)
Dept: PEDIATRIC ALLERGY IMMUNOLOGY | Facility: CLINIC | Age: 65
End: 2025-01-23
Payer: MEDICARE

## 2025-01-23 DIAGNOSIS — B99.9 UNSPECIFIED INFECTIOUS DISEASE: ICD-10-CM

## 2025-01-23 DIAGNOSIS — J39.0 RETROPHARYNGEAL AND PARAPHARYNGEAL ABSCESS: ICD-10-CM

## 2025-01-23 DIAGNOSIS — Z13.0 ENCOUNTER FOR SCREENING FOR OTHER SUSPECTED ENDOCRINE DISORDER: ICD-10-CM

## 2025-01-23 DIAGNOSIS — Z13.228 ENCOUNTER FOR SCREENING FOR OTHER SUSPECTED ENDOCRINE DISORDER: ICD-10-CM

## 2025-01-23 DIAGNOSIS — R06.83 SNORING: ICD-10-CM

## 2025-01-23 DIAGNOSIS — Z13.29 ENCOUNTER FOR SCREENING FOR OTHER SUSPECTED ENDOCRINE DISORDER: ICD-10-CM

## 2025-01-23 PROCEDURE — 99204 OFFICE O/P NEW MOD 45 MIN: CPT | Mod: 93

## 2025-01-25 LAB
ALBUMIN SERPL ELPH-MCNC: 4.4 G/DL
ALP BLD-CCNC: 56 U/L
ALT SERPL-CCNC: 26 U/L
ANION GAP SERPL CALC-SCNC: 12 MMOL/L
AST SERPL-CCNC: 23 U/L
BASOPHILS # BLD AUTO: 0.06 K/UL
BASOPHILS NFR BLD AUTO: 0.8 %
BILIRUB SERPL-MCNC: 0.4 MG/DL
BUN SERPL-MCNC: 21 MG/DL
CALCIUM SERPL-MCNC: 9.3 MG/DL
CHLORIDE SERPL-SCNC: 108 MMOL/L
CO2 SERPL-SCNC: 23 MMOL/L
CREAT SERPL-MCNC: 0.75 MG/DL
EGFR: 88 ML/MIN/1.73M2
EOSINOPHIL # BLD AUTO: 0.27 K/UL
EOSINOPHIL NFR BLD AUTO: 3.6 %
GLUCOSE SERPL-MCNC: 108 MG/DL
HCT VFR BLD CALC: 44.3 %
HGB BLD-MCNC: 14.9 G/DL
IMM GRANULOCYTES NFR BLD AUTO: 0.3 %
LYMPHOCYTES # BLD AUTO: 2.2 K/UL
LYMPHOCYTES NFR BLD AUTO: 29.7 %
MAN DIFF?: NORMAL
MCHC RBC-ENTMCNC: 29.9 PG
MCHC RBC-ENTMCNC: 33.6 G/DL
MCV RBC AUTO: 89 FL
MONOCYTES # BLD AUTO: 0.6 K/UL
MONOCYTES NFR BLD AUTO: 8.1 %
NEUTROPHILS # BLD AUTO: 4.26 K/UL
NEUTROPHILS NFR BLD AUTO: 57.5 %
PLATELET # BLD AUTO: 309 K/UL
POTASSIUM SERPL-SCNC: 4.6 MMOL/L
PROT SERPL-MCNC: 6.3 G/DL
RBC # BLD: 4.98 M/UL
RBC # FLD: 13.8 %
SODIUM SERPL-SCNC: 143 MMOL/L
WBC # FLD AUTO: 7.41 K/UL

## 2025-01-28 LAB
CD16+CD56+ CELLS # BLD: 177 CELLS/UL
CD16+CD56+ CELLS NFR BLD: 8 %
CD19 CELLS NFR BLD: 318 CELLS/UL
CD3 CELLS # BLD: 1550 CELLS/UL
CD3 CELLS NFR BLD: 74 %
CD3+CD4+ CELLS # BLD: 965 CELLS/UL
CD3+CD4+ CELLS NFR BLD: 46 %
CD3+CD4+ CELLS/CD3+CD8+ CLL SPEC: 1.83 RATIO
CD3+CD8+ CELLS # SPEC: 528 CELLS/UL
CD3+CD8+ CELLS NFR BLD: 25 %
CELLS.CD3-CD19+/CELLS IN BLOOD: 15 %
CH50 SERPL-MCNC: 74 U/ML
TOTAL IGE SMQN RAST: 10 KU/L
VIABILITY: NORMAL

## 2025-01-29 LAB
ALBUMIN MFR SERPL ELPH: 61.9 %
ALBUMIN SERPL-MCNC: 3.9 G/DL
ALBUMIN/GLOB SERPL: 1.6 RATIO
ALPHA1 GLOB MFR SERPL ELPH: 4.1 %
ALPHA1 GLOB SERPL ELPH-MCNC: 0.3 G/DL
ALPHA2 GLOB MFR SERPL ELPH: 10.7 %
ALPHA2 GLOB SERPL ELPH-MCNC: 0.7 G/DL
B-GLOBULIN MFR SERPL ELPH: 11.6 %
B-GLOBULIN SERPL ELPH-MCNC: 0.7 G/DL
DEPRECATED KAPPA LC FREE/LAMBDA SER: 1.49 RATIO
GAMMA GLOB FLD ELPH-MCNC: 0.7 G/DL
GAMMA GLOB MFR SERPL ELPH: 11.7 %
IGA SER QL IEP: 96 MG/DL
IGG SER QL IEP: 728 MG/DL
IGM SER QL IEP: 99 MG/DL
INTERPRETATION SERPL IEP-IMP: NORMAL
KAPPA LC CSF-MCNC: 0.75 MG/DL
KAPPA LC SERPL-MCNC: 1.12 MG/DL
M PROTEIN SPEC IFE-MCNC: NORMAL
PROT SERPL-MCNC: 6.3 G/DL
PROT SERPL-MCNC: 6.3 G/DL

## 2025-01-30 LAB
C DIPHTHERIAE AB SER QL: 0.62 IU/ML
C TETANI IGG SER-ACNC: 0.13 IU/ML
MANNAN BINDING LECTIN (MBL): 763 NG/ML

## 2025-01-31 LAB
COMPLEMENT, ALTERNATE PATHWAY (AH50): 93
DEPRECATED S PNEUM 1 IGG SER-MCNC: 3.2 MCG/ML
DEPRECATED S PNEUM12 AB SER-ACNC: 0.2 MCG/ML
DEPRECATED S PNEUM14 AB SER-ACNC: 54.3 MCG/ML
DEPRECATED S PNEUM17 IGG SER IA-MCNC: 2.4 MCG/ML
DEPRECATED S PNEUM18 IGG SER IA-MCNC: 2.6 MCG/ML
DEPRECATED S PNEUM19 IGG SER-MCNC: 0.7 MCG/ML
DEPRECATED S PNEUM19 IGG SER-MCNC: NORMAL MCG/ML
DEPRECATED S PNEUM2 IGG SER-MCNC: 3.6 MCG/ML
DEPRECATED S PNEUM20 IGG SER-MCNC: 0.8 MCG/ML
DEPRECATED S PNEUM22 IGG SER-MCNC: 2 MCG/ML
DEPRECATED S PNEUM23 AB SER-ACNC: 0.6 MCG/ML
DEPRECATED S PNEUM3 AB SER-ACNC: 0.2 MCG/ML
DEPRECATED S PNEUM34 IGG SER-MCNC: 0.8 MCG/ML
DEPRECATED S PNEUM4 AB SER-ACNC: 0.1 MCG/ML
DEPRECATED S PNEUM5 IGG SER-MCNC: 1 MCG/ML
DEPRECATED S PNEUM6 IGG SER-MCNC: 0.3 MCG/ML
DEPRECATED S PNEUM7 IGG SER-ACNC: 0.2 MCG/ML
DEPRECATED S PNEUM8 AB SER-ACNC: 2 MCG/ML
DEPRECATED S PNEUM9 AB SER-ACNC: 0.3 MCG/ML
DEPRECATED S PNEUM9 IGG SER-MCNC: 2.8 MCG/ML
IMMUNOLOGIST REVIEW: NORMAL
STREPTOCOCCUS PNEUMONIAE SEROTYPE 11A: 1 MCG/ML
STREPTOCOCCUS PNEUMONIAE SEROTYPE 15B: 2.5 MCG/ML
STREPTOCOCCUS PNEUMONIAE SEROTYPE 33F: 5.9 MCG/ML

## 2025-02-12 ENCOUNTER — APPOINTMENT (OUTPATIENT)
Dept: PEDIATRIC ALLERGY IMMUNOLOGY | Facility: CLINIC | Age: 65
End: 2025-02-12
Payer: MEDICARE

## 2025-02-12 VITALS
HEART RATE: 77 BPM | HEIGHT: 66 IN | WEIGHT: 148.38 LBS | OXYGEN SATURATION: 96 % | DIASTOLIC BLOOD PRESSURE: 67 MMHG | BODY MASS INDEX: 23.84 KG/M2 | SYSTOLIC BLOOD PRESSURE: 102 MMHG

## 2025-02-12 DIAGNOSIS — R06.83 SNORING: ICD-10-CM

## 2025-02-12 DIAGNOSIS — Z13.0 ENCOUNTER FOR SCREENING FOR OTHER SUSPECTED ENDOCRINE DISORDER: ICD-10-CM

## 2025-02-12 DIAGNOSIS — Z13.228 ENCOUNTER FOR SCREENING FOR OTHER SUSPECTED ENDOCRINE DISORDER: ICD-10-CM

## 2025-02-12 DIAGNOSIS — J06.9 ACUTE UPPER RESPIRATORY INFECTION, UNSPECIFIED: ICD-10-CM

## 2025-02-12 DIAGNOSIS — Z13.29 ENCOUNTER FOR SCREENING FOR OTHER SUSPECTED ENDOCRINE DISORDER: ICD-10-CM

## 2025-02-12 DIAGNOSIS — B99.9 UNSPECIFIED INFECTIOUS DISEASE: ICD-10-CM

## 2025-02-12 PROCEDURE — 99214 OFFICE O/P EST MOD 30 MIN: CPT

## 2025-02-12 RX ORDER — OSELTAMIVIR PHOSPHATE 75 MG/1
75 CAPSULE ORAL
Qty: 10 | Refills: 0 | Status: ACTIVE | COMMUNITY
Start: 2025-02-12 | End: 1900-01-01

## 2025-02-25 ENCOUNTER — APPOINTMENT (OUTPATIENT)
Dept: OTOLARYNGOLOGY | Facility: CLINIC | Age: 65
End: 2025-02-25
Payer: MEDICARE

## 2025-02-25 DIAGNOSIS — J39.0 RETROPHARYNGEAL AND PARAPHARYNGEAL ABSCESS: ICD-10-CM

## 2025-02-25 PROCEDURE — 99214 OFFICE O/P EST MOD 30 MIN: CPT | Mod: 25

## 2025-02-25 PROCEDURE — 31575 DIAGNOSTIC LARYNGOSCOPY: CPT

## 2025-02-25 RX ORDER — AMOXICILLIN AND CLAVULANATE POTASSIUM 875; 125 MG/1; MG/1
875-125 TABLET, COATED ORAL
Qty: 14 | Refills: 0 | Status: ACTIVE | COMMUNITY
Start: 2025-02-25 | End: 1900-01-01

## 2025-08-25 ENCOUNTER — NON-APPOINTMENT (OUTPATIENT)
Age: 65
End: 2025-08-25

## (undated) DEVICE — TUBING SUCTION NONCONDUCTIVE 6MM X 12FT

## (undated) DEVICE — LIJ-SATALOFF ANT COMMISSURE LARYNGOSCOPE: Type: DURABLE MEDICAL EQUIPMENT

## (undated) DEVICE — SOL IRR POUR NS 0.9% 500ML

## (undated) DEVICE — DRSG TELFA 3 X 8

## (undated) DEVICE — DRSG CURITY GAUZE SPONGE 4 X 4" 12-PLY

## (undated) DEVICE — VENODYNE/SCD SLEEVE CALF MEDIUM

## (undated) DEVICE — GLV 8 PROTEXIS (WHITE)

## (undated) DEVICE — DRAPE 3/4 SHEET 52X76"

## (undated) DEVICE — DRAPE INSTRUMENT POUCH 6.75" X 11"

## (undated) DEVICE — WARMING BLANKET UPPER ADULT

## (undated) DEVICE — SOL ANTI FOG

## (undated) DEVICE — GOWN LG

## (undated) DEVICE — LABELS BLANK W PEN

## (undated) DEVICE — WARMING BLANKET LOWER ADULT

## (undated) DEVICE — GLV 7 PROTEXIS (WHITE)

## (undated) DEVICE — Device

## (undated) DEVICE — GUARD TEETH ADULT